# Patient Record
Sex: FEMALE | Race: OTHER | Employment: PART TIME | URBAN - METROPOLITAN AREA
[De-identification: names, ages, dates, MRNs, and addresses within clinical notes are randomized per-mention and may not be internally consistent; named-entity substitution may affect disease eponyms.]

---

## 2018-07-18 ENCOUNTER — HOSPITAL ENCOUNTER (OUTPATIENT)
Dept: GENERAL RADIOLOGY | Facility: HOSPITAL | Age: 23
Discharge: HOME | End: 2018-07-18
Attending: INTERNAL MEDICINE
Payer: COMMERCIAL

## 2018-07-18 ENCOUNTER — HOSPITAL ENCOUNTER (OUTPATIENT)
Dept: CT IMAGING | Facility: HOSPITAL | Age: 23
Discharge: HOME | End: 2018-07-18
Attending: INTERNAL MEDICINE
Payer: COMMERCIAL

## 2018-07-18 ENCOUNTER — OFFICE VISIT (OUTPATIENT)
Dept: INTERNAL MEDICINE CLINIC | Facility: CLINIC | Age: 23
End: 2018-07-18
Payer: COMMERCIAL

## 2018-07-18 VITALS
DIASTOLIC BLOOD PRESSURE: 81 MMHG | RESPIRATION RATE: 18 BRPM | WEIGHT: 123 LBS | HEART RATE: 71 BPM | BODY MASS INDEX: 22.07 KG/M2 | TEMPERATURE: 98 F | HEIGHT: 62.5 IN | SYSTOLIC BLOOD PRESSURE: 122 MMHG

## 2018-07-18 DIAGNOSIS — Z76.89 ENCOUNTER TO ESTABLISH CARE: Primary | ICD-10-CM

## 2018-07-18 DIAGNOSIS — T14.90XA TRAUMA: ICD-10-CM

## 2018-07-18 DIAGNOSIS — M54.2 NECK PAIN: ICD-10-CM

## 2018-07-18 DIAGNOSIS — R51.9 HEADACHE IN BACK OF HEAD: ICD-10-CM
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
## 2018-07-18 PROCEDURE — 99204 OFFICE O/P NEW MOD 45 MIN: CPT | Performed by: INTERNAL MEDICINE

## 2018-07-18 PROCEDURE — 72050 X-RAY EXAM NECK SPINE 4/5VWS: CPT | Performed by: INTERNAL MEDICINE

## 2018-07-18 PROCEDURE — 99212 OFFICE O/P EST SF 10 MIN: CPT | Performed by: INTERNAL MEDICINE

## 2018-07-18 PROCEDURE — 70450 CT HEAD/BRAIN W/O DYE: CPT | Performed by: INTERNAL MEDICINE

## 2018-07-18 RX ORDER — CYCLOBENZAPRINE HCL 5 MG
5 TABLET ORAL NIGHTLY
Qty: 10 TABLET | Refills: 0 | Status: SHIPPED | OUTPATIENT
Start: 2018-07-18
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
## 2018-07-19 ENCOUNTER — TELEPHONE (OUTPATIENT)
Dept: INTERNAL MEDICINE CLINIC | Facility: CLINIC | Age: 23
End: 2018-07-19

## 2021-11-04 ENCOUNTER — OFFICE VISIT (OUTPATIENT)
Dept: OBGYN CLINIC | Facility: CLINIC | Age: 26
End: 2021-11-04
Payer: COMMERCIAL

## 2021-11-04 VITALS
SYSTOLIC BLOOD PRESSURE: 104 MMHG | BODY MASS INDEX: 23 KG/M2 | WEIGHT: 125 LBS | DIASTOLIC BLOOD PRESSURE: 62 MMHG | HEIGHT: 62 IN

## 2021-11-04 DIAGNOSIS — Z01.411 ENCOUNTER FOR GYNECOLOGICAL EXAMINATION WITH ABNORMAL FINDING: ICD-10-CM

## 2021-11-04 DIAGNOSIS — Z01.419 WOMEN'S ANNUAL ROUTINE GYNECOLOGICAL EXAMINATION: Primary | ICD-10-CM

## 2021-11-04 DIAGNOSIS — L73.9 FOLLICULITIS OF PERINEUM: ICD-10-CM

## 2021-11-04 DIAGNOSIS — Z11.3 ROUTINE SCREENING FOR STI (SEXUALLY TRANSMITTED INFECTION): ICD-10-CM

## 2021-11-04 PROCEDURE — 3008F BODY MASS INDEX DOCD: CPT | Performed by: OBSTETRICS & GYNECOLOGY

## 2021-11-04 PROCEDURE — 3074F SYST BP LT 130 MM HG: CPT | Performed by: OBSTETRICS & GYNECOLOGY

## 2021-11-04 PROCEDURE — 3078F DIAST BP <80 MM HG: CPT | Performed by: OBSTETRICS & GYNECOLOGY

## 2021-11-04 PROCEDURE — 99385 PREV VISIT NEW AGE 18-39: CPT | Performed by: OBSTETRICS & GYNECOLOGY

## 2021-11-04 RX ORDER — CLINDAMYCIN PHOSPHATE AND BENZOYL PEROXIDE 10; 37.5 MG/G; MG/G
GEL TOPICAL
COMMUNITY
Start: 2021-11-01 | End: 2021-11-04

## 2021-11-04 RX ORDER — CLINDAMYCIN PHOSPHATE AND BENZOYL PEROXIDE 10; 37.5 MG/G; MG/G
GEL TOPICAL
COMMUNITY
Start: 2021-03-29

## 2021-11-04 RX ORDER — CEPHALEXIN 500 MG/1
500 CAPSULE ORAL 3 TIMES DAILY
Qty: 21 CAPSULE | Refills: 0 | Status: SHIPPED | OUTPATIENT
Start: 2021-11-04 | End: 2021-11-11

## 2021-11-04 RX ORDER — MULTIVITAMIN
1 TABLET ORAL DAILY
COMMUNITY

## 2021-11-04 RX ORDER — TAZAROTENE 0.45 MG/G
LOTION TOPICAL
COMMUNITY
Start: 2021-03-29

## 2021-11-04 NOTE — PROGRESS NOTES
Subjective:   Patient ID: Lex Farfan is a 23year old female. Patient here for routine exam and her first pap smear. Reports some midcycle spotting and pain at times. Discussed ovulation pain with midcycle spotting and patient reassured.   Inst Normal breath sounds. Comments: Breast Exam:  No masses. No nipple discharge. No adenopathy. Abdominal:      General: Bowel sounds are normal.      Palpations: Abdomen is soft. There is no mass. Tenderness: There is no abdominal tenderness.

## 2022-02-28 ENCOUNTER — HOSPITAL ENCOUNTER (EMERGENCY)
Facility: HOSPITAL | Age: 27
Discharge: HOME | End: 2022-02-28
Attending: EMERGENCY MEDICINE
Payer: COMMERCIAL

## 2022-02-28 ENCOUNTER — OFFICE VISIT (OUTPATIENT)
Dept: OBGYN CLINIC | Facility: CLINIC | Age: 27
End: 2022-02-28
Payer: COMMERCIAL

## 2022-02-28 VITALS
BODY MASS INDEX: 23.92 KG/M2 | DIASTOLIC BLOOD PRESSURE: 77 MMHG | OXYGEN SATURATION: 100 % | TEMPERATURE: 98 F | SYSTOLIC BLOOD PRESSURE: 123 MMHG | RESPIRATION RATE: 20 BRPM | HEIGHT: 62 IN | WEIGHT: 130 LBS | HEART RATE: 82 BPM

## 2022-02-28 VITALS — BODY MASS INDEX: 24 KG/M2 | SYSTOLIC BLOOD PRESSURE: 109 MMHG | DIASTOLIC BLOOD PRESSURE: 71 MMHG | WEIGHT: 129.81 LBS

## 2022-02-28 DIAGNOSIS — N92.3 INTERMENSTRUAL SPOTTING: ICD-10-CM

## 2022-02-28 DIAGNOSIS — L73.9 FOLLICULITIS: Primary | ICD-10-CM

## 2022-02-28 DIAGNOSIS — L73.1 INGROWN HAIR: ICD-10-CM

## 2022-02-28 DIAGNOSIS — L02.91 ABSCESS: ICD-10-CM

## 2022-02-28 DIAGNOSIS — L02.91 ABSCESS: Primary | ICD-10-CM

## 2022-02-28 LAB — B-HCG UR QL: NEGATIVE

## 2022-02-28 PROCEDURE — 3078F DIAST BP <80 MM HG: CPT | Performed by: NURSE PRACTITIONER

## 2022-02-28 PROCEDURE — 99213 OFFICE O/P EST LOW 20 MIN: CPT | Performed by: NURSE PRACTITIONER

## 2022-02-28 PROCEDURE — 81025 URINE PREGNANCY TEST: CPT

## 2022-02-28 PROCEDURE — 99283 EMERGENCY DEPT VISIT LOW MDM: CPT

## 2022-02-28 PROCEDURE — 10060 I&D ABSCESS SIMPLE/SINGLE: CPT

## 2022-02-28 PROCEDURE — 3074F SYST BP LT 130 MM HG: CPT | Performed by: NURSE PRACTITIONER

## 2022-02-28 RX ORDER — DOXYCYCLINE HYCLATE 100 MG/1
100 CAPSULE ORAL 2 TIMES DAILY
Qty: 20 CAPSULE | Refills: 0 | Status: SHIPPED | OUTPATIENT
Start: 2022-02-28 | End: 2022-03-10
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
## 2022-02-28 NOTE — ED QUICK NOTES
Discharge summary reviewed including medication administration and follow up appointments. Advised to return to the Emergency Department with new or worsening symptoms. Patient verbalized understanding. All questions answered at this time.

## 2022-03-04 ENCOUNTER — OFFICE VISIT (OUTPATIENT)
Dept: OBGYN CLINIC | Facility: CLINIC | Age: 27
End: 2022-03-04
Payer: COMMERCIAL

## 2022-03-04 VITALS — SYSTOLIC BLOOD PRESSURE: 110 MMHG | WEIGHT: 130.63 LBS | BODY MASS INDEX: 24 KG/M2 | DIASTOLIC BLOOD PRESSURE: 60 MMHG

## 2022-03-04 DIAGNOSIS — N73.9 ABSCESS OF FEMALE GENITALIA: Primary | ICD-10-CM

## 2022-03-04 PROCEDURE — 3078F DIAST BP <80 MM HG: CPT | Performed by: NURSE PRACTITIONER

## 2022-03-04 PROCEDURE — 3074F SYST BP LT 130 MM HG: CPT | Performed by: NURSE PRACTITIONER

## 2022-03-04 PROCEDURE — 99213 OFFICE O/P EST LOW 20 MIN: CPT | Performed by: NURSE PRACTITIONER

## 2022-05-16 ENCOUNTER — TELEPHONE (OUTPATIENT)
Dept: OBGYN CLINIC | Facility: CLINIC | Age: 27
End: 2022-05-16

## 2022-05-16 NOTE — TELEPHONE ENCOUNTER
Spoke to patient, stated she her last menses was 40 days ago. Stated her menses are usually on time. took 2 pregnancy test 5 days ago that were negative. Did indicate she took plan b over a month ago but did get her menses 2 wks that. Patient would like blood work order for hormone level. Patient advised S.O. is not in the office today but will send the message to her. Patient stated she has an appt. with her tomorrow. Advised she can follow up with S.O. tomorrow.

## 2022-05-17 ENCOUNTER — OFFICE VISIT (OUTPATIENT)
Dept: OBGYN CLINIC | Facility: CLINIC | Age: 27
End: 2022-05-17
Payer: COMMERCIAL

## 2022-05-17 VITALS
DIASTOLIC BLOOD PRESSURE: 82 MMHG | WEIGHT: 134.81 LBS | BODY MASS INDEX: 25 KG/M2 | HEART RATE: 62 BPM | SYSTOLIC BLOOD PRESSURE: 128 MMHG

## 2022-05-17 DIAGNOSIS — N92.6 IRREGULAR MENSTRUAL CYCLE: Primary | ICD-10-CM

## 2022-05-17 LAB
CONTROL LINE PRESENT WITH A CLEAR BACKGROUND (YES/NO): YES YES/NO
PREGNANCY TEST, URINE: NEGATIVE

## 2022-05-17 PROCEDURE — 3074F SYST BP LT 130 MM HG: CPT | Performed by: NURSE PRACTITIONER

## 2022-05-17 PROCEDURE — 81025 URINE PREGNANCY TEST: CPT | Performed by: NURSE PRACTITIONER

## 2022-05-17 PROCEDURE — 99213 OFFICE O/P EST LOW 20 MIN: CPT | Performed by: NURSE PRACTITIONER

## 2022-05-17 PROCEDURE — 3079F DIAST BP 80-89 MM HG: CPT | Performed by: NURSE PRACTITIONER

## 2022-05-17 RX ORDER — CLINDAMYCIN PHOSPHATE 10 MG/G
GEL TOPICAL
COMMUNITY
Start: 2022-05-04

## 2022-05-17 RX ORDER — TAZAROTENE 1 MG/G
AEROSOL, FOAM TOPICAL
COMMUNITY
Start: 2022-05-10

## 2022-05-17 RX ORDER — CLASCOTERONE 1 G/100G
CREAM TOPICAL
COMMUNITY
Start: 2022-05-10

## 2022-06-16 ENCOUNTER — LAB ENCOUNTER (OUTPATIENT)
Dept: LAB | Facility: HOSPITAL | Age: 27
End: 2022-06-16
Attending: INTERNAL MEDICINE
Payer: COMMERCIAL

## 2022-06-16 DIAGNOSIS — N92.6 IRREGULAR MENSTRUAL CYCLE: ICD-10-CM

## 2022-06-16 DIAGNOSIS — Z11.3 ROUTINE SCREENING FOR STI (SEXUALLY TRANSMITTED INFECTION): ICD-10-CM

## 2022-06-16 LAB
HBV SURFACE AG SER-ACNC: <0.1 [IU]/L
HBV SURFACE AG SERPL QL IA: NONREACTIVE
HCV AB SERPL QL IA: NONREACTIVE
TSI SER-ACNC: 1.53 MIU/ML (ref 0.36–3.74)

## 2022-06-16 PROCEDURE — 87340 HEPATITIS B SURFACE AG IA: CPT

## 2022-06-16 PROCEDURE — 86803 HEPATITIS C AB TEST: CPT

## 2022-06-16 PROCEDURE — 36415 COLL VENOUS BLD VENIPUNCTURE: CPT

## 2022-06-16 PROCEDURE — 86780 TREPONEMA PALLIDUM: CPT

## 2022-06-16 PROCEDURE — 84443 ASSAY THYROID STIM HORMONE: CPT

## 2022-06-16 PROCEDURE — 87389 HIV-1 AG W/HIV-1&-2 AB AG IA: CPT

## 2022-06-17 LAB — T PALLIDUM AB SER QL: NEGATIVE

## 2022-08-11 ENCOUNTER — TELEPHONE (OUTPATIENT)
Dept: OBGYN CLINIC | Facility: CLINIC | Age: 27
End: 2022-08-11

## 2022-08-11 NOTE — TELEPHONE ENCOUNTER
Pt is asking if a chlymidia test has ever been done. Pt is also asking if an appt can be done for this iin-office or does this require an order.     Please advise

## 2022-08-15 ENCOUNTER — OFFICE VISIT (OUTPATIENT)
Dept: OBGYN CLINIC | Facility: CLINIC | Age: 27
End: 2022-08-15
Payer: COMMERCIAL

## 2022-08-15 ENCOUNTER — LAB ENCOUNTER (OUTPATIENT)
Dept: LAB | Facility: HOSPITAL | Age: 27
End: 2022-08-15
Attending: NURSE PRACTITIONER
Payer: COMMERCIAL

## 2022-08-15 VITALS — SYSTOLIC BLOOD PRESSURE: 140 MMHG | DIASTOLIC BLOOD PRESSURE: 98 MMHG

## 2022-08-15 DIAGNOSIS — Z11.3 SCREEN FOR STD (SEXUALLY TRANSMITTED DISEASE): Primary | ICD-10-CM

## 2022-08-15 DIAGNOSIS — Z11.3 SCREEN FOR STD (SEXUALLY TRANSMITTED DISEASE): ICD-10-CM

## 2022-08-15 LAB
HBV SURFACE AG SER-ACNC: <0.1 [IU]/L
HBV SURFACE AG SERPL QL IA: NONREACTIVE
HCV AB SERPL QL IA: NONREACTIVE

## 2022-08-15 PROCEDURE — 36415 COLL VENOUS BLD VENIPUNCTURE: CPT

## 2022-08-15 PROCEDURE — 87389 HIV-1 AG W/HIV-1&-2 AB AG IA: CPT

## 2022-08-15 PROCEDURE — 3077F SYST BP >= 140 MM HG: CPT | Performed by: NURSE PRACTITIONER

## 2022-08-15 PROCEDURE — 87340 HEPATITIS B SURFACE AG IA: CPT

## 2022-08-15 PROCEDURE — 86780 TREPONEMA PALLIDUM: CPT

## 2022-08-15 PROCEDURE — 99213 OFFICE O/P EST LOW 20 MIN: CPT | Performed by: NURSE PRACTITIONER

## 2022-08-15 PROCEDURE — 86803 HEPATITIS C AB TEST: CPT

## 2022-08-15 PROCEDURE — 3080F DIAST BP >= 90 MM HG: CPT | Performed by: NURSE PRACTITIONER

## 2022-08-16 LAB
C TRACH DNA SPEC QL NAA+PROBE: NEGATIVE
N GONORRHOEA DNA SPEC QL NAA+PROBE: NEGATIVE

## 2022-08-17 LAB — T PALLIDUM AB SER QL: NEGATIVE

## 2022-08-18 LAB
GENITAL VAGINOSIS SCREEN: NEGATIVE
TRICHOMONAS SCREEN: NEGATIVE

## 2024-01-18 ENCOUNTER — OFFICE VISIT (OUTPATIENT)
Dept: DERMATOLOGY CLINIC | Facility: CLINIC | Age: 29
End: 2024-01-18

## 2024-01-18 ENCOUNTER — TELEPHONE (OUTPATIENT)
Dept: DERMATOLOGY CLINIC | Facility: CLINIC | Age: 29
End: 2024-01-18

## 2024-01-18 ENCOUNTER — LAB ENCOUNTER (OUTPATIENT)
Dept: LAB | Age: 29
End: 2024-01-18
Attending: STUDENT IN AN ORGANIZED HEALTH CARE EDUCATION/TRAINING PROGRAM
Payer: MEDICAID

## 2024-01-18 DIAGNOSIS — Z51.81 MEDICATION MONITORING ENCOUNTER: Primary | ICD-10-CM

## 2024-01-18 DIAGNOSIS — L70.8 OTHER ACNE: ICD-10-CM

## 2024-01-18 DIAGNOSIS — Z51.81 MEDICATION MONITORING ENCOUNTER: ICD-10-CM

## 2024-01-18 LAB
ALT SERPL-CCNC: 9 U/L
AST SERPL-CCNC: 12 U/L (ref ?–34)
B-HCG UR QL: NEGATIVE
TRIGL SERPL-MCNC: 47 MG/DL (ref 30–149)

## 2024-01-18 PROCEDURE — 84450 TRANSFERASE (AST) (SGOT): CPT

## 2024-01-18 PROCEDURE — 81025 URINE PREGNANCY TEST: CPT

## 2024-01-18 PROCEDURE — 36415 COLL VENOUS BLD VENIPUNCTURE: CPT

## 2024-01-18 PROCEDURE — 84460 ALANINE AMINO (ALT) (SGPT): CPT

## 2024-01-18 PROCEDURE — 84478 ASSAY OF TRIGLYCERIDES: CPT

## 2024-01-18 PROCEDURE — 99204 OFFICE O/P NEW MOD 45 MIN: CPT | Performed by: STUDENT IN AN ORGANIZED HEALTH CARE EDUCATION/TRAINING PROGRAM

## 2024-01-18 RX ORDER — DAPSONE 50 MG/G
GEL TOPICAL
COMMUNITY
Start: 2022-07-05

## 2024-01-18 RX ORDER — ISOTRETINOIN 40 MG/1
40 CAPSULE ORAL DAILY
Qty: 30 CAPSULE | Refills: 0 | Status: SHIPPED | OUTPATIENT
Start: 2024-01-18

## 2024-01-18 RX ORDER — NORGESTIMATE AND ETHINYL ESTRADIOL 0.25-0.035
1 KIT ORAL DAILY
Qty: 84 TABLET | Refills: 0 | Status: SHIPPED | OUTPATIENT
Start: 2024-01-18 | End: 2024-01-18

## 2024-01-18 RX ORDER — NORGESTIMATE AND ETHINYL ESTRADIOL 0.25-0.035
1 KIT ORAL DAILY
Qty: 84 TABLET | Refills: 3 | Status: SHIPPED | OUTPATIENT
Start: 2024-01-18 | End: 2025-01-17

## 2024-01-18 NOTE — PROGRESS NOTES
January 18, 2024    New patient     CHIEF COMPLAINT: Acne    HISTORY OF PRESENT ILLNESS: .    1. Acne  Location: Face, chin, upper neck and sids of face   Duration: years  Signs and symptoms: white heads  Current treatment: Tazarotene 0.05 % Cream and dapzone cream  Past treatments: clindamycin       DERM HISTORY:  History of skin cancer: No  History of chronic skin disease/condition: No    FAMILY HISTORY:  History of melanoma: No  History of chronic skin disease/condition: No    History/Other:    REVIEW OF SYSTEMS:  Constitutional: Denies fever, chills, unintentional weight loss.   Skin as per HPI    PAST MEDICAL HISTORY:  Past Medical History:   Diagnosis Date    Acne        Medications  Current Outpatient Medications   Medication Sig Dispense Refill    Clascoterone (WINLEVI) 1 % External Cream       Clindamycin Phosphate 1 % External Gel       Tazarotene 0.1 % External Foam       Tazarotene 0.05 % External Cream       Multiple Vitamin Oral Tab Take 1 tablet by mouth daily.         Objective:    PHYSICAL EXAM:  General: awake, alert, no acute distress  Skin: Skin exam was performed today including the following: face. Pertinent findings include:   - with erythematous papules    ASSESSMENT & PLAN:  Pathophysiology of diagnoses discussed with patient.  Therapeutic options reviewed. Risks, benefits, and alternatives discussed with patient. Instructions reviewed at length.    #Acne Vulgaris, nodulocystic  - Given patient has failed topical medications and oral medications, isotretinoin is recommended at this point. Reviewed alternative systemic treatment options. Discussed, with patient and parent, that without systemic therapy, risks include irreversible scarring of affected areas.  - Goal dose 200mg/kg = 14986 mg    - Discussed patient cannot become pregnant, breast feed, or donate blood while on the medication and for 1 month after stopping. Discussed that patient cannot share medication.  - Labs today  AST, ALT,  fasting lipid panel, qualitative HCG  - Qualitative HCG to be repeated at least one month after initial testing and within the first 5 days of menstrual cycle. If pregnancy test negative at that time, script for isotretinoin to be sent to pharmacy and patient to be confirmed in iPledge.    Contraceptive methods: PRIMARY: OCP, SECONDARY:condoms       I discussed at length the issues of isotretnoin therapy including goal of treatment, medication dosage, duration of therapy, and side effects, as well as the alternative to care.     Patient denies personal/family hx of IBD  Patient denies personal/family hx of depression/suicide    Reviewed adverse effects including those that are common and not serious, such as dry skin (xerosis) with/without retinoid dermatitis, dry lips (cheilitis), dry nose (xeromycteria) with/without epistaxis, dry eyes (xerophthalmia), irritation of contact lenses, dyslipidemia (increase in cholesterol and triglycerides), phototoxicity, possible exacerbation/flaring of acne vulgaris in the first 4 to 6 weeks of therapy, and arthralgias/myalgias (muscle aches and joint pains), as well as those that are rare, but serious, such as pseudotumor cerebri, major depressive disorder, suicidal ideation, hepatotoxicity, pancreatitis, teratogenicity (females), anemia/leukopenia, changes to night vision, and possible unmasking or exacerbation of inflammatory bowel disease.    The patient understands there is a possibility of acne recurrence; however, acne is often easier to treat if it recurs after isotretinoin therapy. Patient agrees to not give blood during treatment and for 1 month after treatment. Patient agrees not to share medication. Patient agrees remain on chosen forms of contraception while on the medication and continue 1 month after stopping. If there are any adverse events/symptoms including depression, thoughts of suicide, diarrhea, abdominal pain or cramping, blood in stool, or other concerning  side effects, patient will contact us or head directly to ED for immediate evaluation. Patient informed to stop all other acne medications while on isotretinoin. Informed patient they can take Ibuprofen or NSAIDs for joint aches. Instructed to avoid Tylenol and alcohol due to increased risk of liver toxicity while on isotretinoin. Questions were answered, Patient expressed understanding of the risks, benefits, alternatives and guidelines and would like to proceed with isotretinoin therapy. Counseling and consents signed today and iPledge booklet given to patient.      Return to clinic: 2 months or sooner if something concerning arises     Rashel Gutierrez MD

## 2024-01-18 NOTE — TELEPHONE ENCOUNTER
Patient Name: Alessia Bhatti   Street Address: [address]    State: [place]  Zip Code: [address]   Telephone #: [phone]   E-Mail Address:   [email or web address]     Preferred Method of Contact (e-mail or text): Text    Date Consent Signed: 01/18/24    Childbearing Potential Patients  Primary Contraceptive:Birth Control  Secondary Contraceptive: Condom    Labs in process

## 2024-01-19 NOTE — TELEPHONE ENCOUNTER
Fax from [facility]    Denied for ISOTRETINOIN    placed fax in pa inbox

## 2024-01-22 NOTE — TELEPHONE ENCOUNTER
Dr. Gutierrez - [place] denied.  Would you like to appeal?  PA denial letter in your office for review.  Thank you.

## 2024-02-07 ENCOUNTER — MED REC SCAN ONLY (OUTPATIENT)
Dept: DERMATOLOGY CLINIC | Facility: CLINIC | Age: 29
End: 2024-02-07

## 2024-02-14 ENCOUNTER — TELEPHONE (OUTPATIENT)
Dept: DERMATOLOGY CLINIC | Facility: CLINIC | Age: 29
End: 2024-02-14

## 2024-02-17 ENCOUNTER — LAB ENCOUNTER (OUTPATIENT)
Dept: LAB | Age: 29
End: 2024-02-17
Attending: STUDENT IN AN ORGANIZED HEALTH CARE EDUCATION/TRAINING PROGRAM
Payer: MEDICAID

## 2024-02-17 DIAGNOSIS — L70.8 OTHER ACNE: ICD-10-CM

## 2024-02-17 DIAGNOSIS — Z51.81 MEDICATION MONITORING ENCOUNTER: ICD-10-CM

## 2024-02-17 LAB
ALT SERPL-CCNC: 11 U/L
AST SERPL-CCNC: 15 U/L (ref ?–34)
B-HCG UR QL: NEGATIVE
TRIGL SERPL-MCNC: 58 MG/DL (ref 30–149)

## 2024-02-17 PROCEDURE — 36415 COLL VENOUS BLD VENIPUNCTURE: CPT

## 2024-02-17 PROCEDURE — 84478 ASSAY OF TRIGLYCERIDES: CPT

## 2024-02-17 PROCEDURE — 81025 URINE PREGNANCY TEST: CPT

## 2024-02-17 PROCEDURE — 84460 ALANINE AMINO (ALT) (SGPT): CPT

## 2024-02-17 PROCEDURE — 84450 TRANSFERASE (AST) (SGOT): CPT

## 2024-02-21 ENCOUNTER — PATIENT MESSAGE (OUTPATIENT)
Dept: DERMATOLOGY CLINIC | Facility: CLINIC | Age: 29
End: 2024-02-21

## 2024-02-21 DIAGNOSIS — L73.0 ACNE SCARRING: ICD-10-CM

## 2024-02-21 DIAGNOSIS — L70.8 OTHER ACNE: ICD-10-CM

## 2024-02-21 DIAGNOSIS — L70.0 NODULOCYSTIC ACNE: ICD-10-CM

## 2024-02-21 DIAGNOSIS — L70.0 ACNE VULGARIS: ICD-10-CM

## 2024-02-22 RX ORDER — ISOTRETINOIN 40 MG/1
40 CAPSULE ORAL DAILY
Qty: 30 CAPSULE | Refills: 0 | Status: SHIPPED
Start: 2024-02-22

## 2024-02-22 NOTE — TELEPHONE ENCOUNTER
Dr. Gutierrez - Pt states she will use a Goodrx coupon for the Isotretinoin.  Pt asking if you could please call and give a verbal to the [facility] in [place] [phone].  Today is the last day patient can  rx within the window.  Thank you.

## 2024-02-22 NOTE — TELEPHONE ENCOUNTER
From: Alessia Bhatti  To: Rashel Gutierrez  Sent: 2/21/2024 7:07 PM CST  Subject: Accutane Follow-up     Hello Dr. Gutierrez,    I’m just following up to see if you were able to put in my negative pregnancy test results from the weekend into my iPledge account. I’ve been checking it over the last few days and it’s still showing my pregnancy test is due.     Thank you!

## 2024-02-22 NOTE — TELEPHONE ENCOUNTER
Called pt's plan and they could not find active coverage for her. Tahir sent to pt, she needs to contact her plan. I did manage her per DM's VORB. She has until tomorrow to  the RX if she can get the INS figured out. LMTCB for pt.    Prescription Window  Patient can obtain their prescription from:  February 17, 2024 - February 23, 2024 (7 - Day Prescription window)

## 2024-02-22 NOTE — TELEPHONE ENCOUNTER
S/w pt - she is aware of the INS issue below - states she will pay out of pocket - I did resent RX with DM's VORB as prior RX transmission failed. Pt will follow up with pharmacy today, I did also leave her a msg that her window will  tomorrow and this must be done today.

## 2024-03-10 ENCOUNTER — HOSPITAL ENCOUNTER (EMERGENCY)
Facility: HOSPITAL | Age: 29
Discharge: HOME | End: 2024-03-10
Attending: EMERGENCY MEDICINE
Payer: MEDICAID

## 2024-03-10 VITALS
HEART RATE: 93 BPM | TEMPERATURE: 98 F | BODY MASS INDEX: 23.92 KG/M2 | HEIGHT: 62 IN | RESPIRATION RATE: 18 BRPM | DIASTOLIC BLOOD PRESSURE: 80 MMHG | SYSTOLIC BLOOD PRESSURE: 162 MMHG | WEIGHT: 130 LBS | OXYGEN SATURATION: 99 %

## 2024-03-10 DIAGNOSIS — L03.019 ONYCHIA AND PARONYCHIA OF FINGER: ICD-10-CM

## 2024-03-10 DIAGNOSIS — L60.0 INGROWN NAIL OF GREAT TOE OF RIGHT FOOT: Primary | ICD-10-CM

## 2024-03-10 PROCEDURE — 99283 EMERGENCY DEPT VISIT LOW MDM: CPT

## 2024-03-10 PROCEDURE — 11750 EXCISION NAIL&NAIL MATRIX: CPT

## 2024-03-10 RX ORDER — SULFAMETHOXAZOLE AND TRIMETHOPRIM 800; 160 MG/1; MG/1
1 TABLET ORAL 2 TIMES DAILY
Qty: 10 TABLET | Refills: 0 | Status: SHIPPED | OUTPATIENT
Start: 2024-03-10 | End: 2024-03-15
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
## 2024-03-13 ENCOUNTER — OFFICE VISIT (OUTPATIENT)
Facility: LOCATION | Age: 29
End: 2024-03-13
Payer: MEDICAID

## 2024-03-13 DIAGNOSIS — L60.0 INGROWING NAIL, RIGHT GREAT TOE: Primary | ICD-10-CM

## 2024-03-13 PROCEDURE — 99202 OFFICE O/P NEW SF 15 MIN: CPT | Performed by: PODIATRIST
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
## 2024-03-21 ENCOUNTER — LAB ENCOUNTER (OUTPATIENT)
Dept: LAB | Age: 29
End: 2024-03-21
Attending: STUDENT IN AN ORGANIZED HEALTH CARE EDUCATION/TRAINING PROGRAM
Payer: MEDICAID

## 2024-03-21 DIAGNOSIS — L70.8 OTHER ACNE: ICD-10-CM

## 2024-03-21 DIAGNOSIS — Z51.81 MEDICATION MONITORING ENCOUNTER: ICD-10-CM

## 2024-03-21 LAB
ALT SERPL-CCNC: 8 U/L
AST SERPL-CCNC: 17 U/L (ref ?–34)
B-HCG UR QL: NEGATIVE
TRIGL SERPL-MCNC: 35 MG/DL (ref 30–149)

## 2024-03-21 PROCEDURE — 84478 ASSAY OF TRIGLYCERIDES: CPT

## 2024-03-21 PROCEDURE — 84450 TRANSFERASE (AST) (SGOT): CPT

## 2024-03-21 PROCEDURE — 84460 ALANINE AMINO (ALT) (SGPT): CPT

## 2024-03-21 PROCEDURE — 36415 COLL VENOUS BLD VENIPUNCTURE: CPT

## 2024-03-21 PROCEDURE — 81025 URINE PREGNANCY TEST: CPT

## 2024-03-22 ENCOUNTER — OFFICE VISIT (OUTPATIENT)
Dept: OBGYN CLINIC | Facility: CLINIC | Age: 29
End: 2024-03-22
Payer: MEDICAID

## 2024-03-22 ENCOUNTER — LAB ENCOUNTER (OUTPATIENT)
Dept: LAB | Facility: HOSPITAL | Age: 29
End: 2024-03-22
Attending: STUDENT IN AN ORGANIZED HEALTH CARE EDUCATION/TRAINING PROGRAM
Payer: MEDICAID

## 2024-03-22 VITALS
SYSTOLIC BLOOD PRESSURE: 118 MMHG | HEIGHT: 63 IN | DIASTOLIC BLOOD PRESSURE: 72 MMHG | WEIGHT: 143 LBS | BODY MASS INDEX: 25.34 KG/M2

## 2024-03-22 DIAGNOSIS — L70.8 OTHER ACNE: ICD-10-CM

## 2024-03-22 DIAGNOSIS — L70.0 NODULOCYSTIC ACNE: ICD-10-CM

## 2024-03-22 DIAGNOSIS — L70.0 ACNE VULGARIS: ICD-10-CM

## 2024-03-22 DIAGNOSIS — L73.0 ACNE SCARRING: ICD-10-CM

## 2024-03-22 DIAGNOSIS — Z11.3 SCREENING EXAMINATION FOR STD (SEXUALLY TRANSMITTED DISEASE): ICD-10-CM

## 2024-03-22 DIAGNOSIS — Z01.419 WELL WOMAN EXAM WITH ROUTINE GYNECOLOGICAL EXAM: Primary | ICD-10-CM

## 2024-03-22 DIAGNOSIS — Z32.00 PREGNANCY EXAMINATION OR TEST, PREGNANCY UNCONFIRMED: ICD-10-CM

## 2024-03-22 LAB
CONTROL LINE PRESENT WITH A CLEAR BACKGROUND (YES/NO): YES YES/NO
HBV SURFACE AG SER-ACNC: 0.23 [IU]/L
HBV SURFACE AG SERPL QL IA: NONREACTIVE
HCV AB SERPL QL IA: NONREACTIVE
PREGNANCY TEST, URINE: NEGATIVE
T PALLIDUM AB SER QL IA: NONREACTIVE

## 2024-03-22 PROCEDURE — 36415 COLL VENOUS BLD VENIPUNCTURE: CPT

## 2024-03-22 PROCEDURE — 81025 URINE PREGNANCY TEST: CPT | Performed by: STUDENT IN AN ORGANIZED HEALTH CARE EDUCATION/TRAINING PROGRAM

## 2024-03-22 PROCEDURE — 90651 9VHPV VACCINE 2/3 DOSE IM: CPT | Performed by: STUDENT IN AN ORGANIZED HEALTH CARE EDUCATION/TRAINING PROGRAM

## 2024-03-22 PROCEDURE — 86780 TREPONEMA PALLIDUM: CPT

## 2024-03-22 PROCEDURE — 87340 HEPATITIS B SURFACE AG IA: CPT

## 2024-03-22 PROCEDURE — 86803 HEPATITIS C AB TEST: CPT

## 2024-03-22 PROCEDURE — 90471 IMMUNIZATION ADMIN: CPT | Performed by: STUDENT IN AN ORGANIZED HEALTH CARE EDUCATION/TRAINING PROGRAM

## 2024-03-22 PROCEDURE — 87389 HIV-1 AG W/HIV-1&-2 AB AG IA: CPT

## 2024-03-22 PROCEDURE — 99385 PREV VISIT NEW AGE 18-39: CPT | Performed by: STUDENT IN AN ORGANIZED HEALTH CARE EDUCATION/TRAINING PROGRAM

## 2024-03-22 NOTE — PROGRESS NOTES
[facility]  Obstetrics and Gynecology  Annual  Nacho Shukla PA-C    Chief Complaint   Patient presents with    Annual     Pt presents for annual exam no concerns       Alessia Bhatti is a 29 year old female  presenting for her annual. Patient's last menstrual period was 2024 (exact date). Cycles are regular with menses lasting about 5 days with varying flow. She is currently on Sprintec as she is taking Acutane. Has not been sexually active in over a year, but would like to have a full STD screening completed. Patient thinks she received only the first dose of the Gardasil vaccine, cannot obtain records from her pediatrician. Interested in receiving vaccine today. She denies any abnormal vaginal discharge, odor, irritation, or itching. No breast masses or pain. No dysuria or hematuria.      Pap:2021   Contraception: OCPs    OBSTETRICS HISTORY:     OB History    Para Term  AB Living   0 0 0 0 0 0   SAB IAB Ectopic Multiple Live Births   0 0 0 0 0       GYNE HISTORY:     Menarche: 12 y/o (3/22/2024  9:01 AM)  Period Cycle (Days): 28 days (3/22/2024  9:01 AM)  Period Duration (Days): 5 days (3/22/2024  9:01 AM)  Period Flow: varies (3/22/2024  9:01 AM)  Use of Birth Control (if yes, specify type): OCP (3/22/2024  9:01 AM)  Hx Prior Abnormal Pap: No (3/22/2024  9:01 AM)  Pap Date: 21 (3/22/2024  9:01 AM)  Pap Result Notes: Normal (3/22/2024  9:01 AM)      History   Sexual Activity    Sexual activity: Not on file           Latest Ref Rng & Units 2021     2:30 PM   RECENT PAP RESULTS   Thinprep Pap Negative for intraepithelial lesion or malignancy Negative for intraepithelial lesion or malignancy    HPV Negative Negative          MEDICAL HISTORY:     Past Medical History:   Diagnosis Date    Acne       Past Surgical History:   Procedure Laterality Date    OTHER SURGICAL HISTORY  March-2021    Mole removals       SOCIAL HISTORY:     Tobacco Use: Low Risk  (3/22/2024)     Patient History     Smoking Tobacco Use: Never     Smokeless Tobacco Use: Never     Passive Exposure: Not on file       Depression Screening:   Depression Screening (PHQ-2/PHQ-9): Over the LAST 2 WEEKS   Little interest or pleasure in doing things (over the last two weeks)?: Not at all  Little interest or pleasure in doing things: Not at all    Feeling down, depressed, or hopeless (over the last two weeks)?: Not at all  Feeling down, depressed, or hopeless: Not at all    PHQ-2 SCORE: 0  PHQ-2 SCORE: 0          FAMILY HISTORY:     Family History   Problem Relation Age of Onset    Cancer Paternal Grandmother         Breast cancer       MEDICATIONS:       Current Outpatient Medications:     Isotretinoin 40 MG Oral Cap, Take 1 capsule (40 mg total) by mouth daily., Disp: 30 capsule, Rfl: 0    Norgestimate-Eth Estradiol (SPRINTEC 28) 0.25-35 MG-MCG Oral Tab, Take 1 tablet by mouth daily., Disp: 84 tablet, Rfl: 3    Multiple Vitamin Oral Tab, Take 1 tablet by mouth daily., Disp: , Rfl:     ALLERGIES:     No Known Allergies    REVIEW OF SYSTEMS:     Review of Systems   Constitutional:  Negative for chills, fever and unexpected weight change.   Respiratory: Negative.     Cardiovascular: Negative.    Gastrointestinal:  Negative for abdominal pain, constipation, diarrhea and nausea.   Genitourinary:  Negative for dyspareunia, dysuria, genital sores, hematuria, menstrual problem, pelvic pain, vaginal bleeding, vaginal discharge and vaginal pain.   Musculoskeletal: Negative.    Skin: Negative.    Neurological: Negative.    Hematological: Negative.    Psychiatric/Behavioral: Negative.           PHYSICAL EXAM:     Vitals:    03/22/24 0902   BP: 118/72   Weight: 143 lb (64.9 kg)   Height: 5' 3\" (1.6 m)       Body mass index is 25.33 kg/m².     Constitutional: well developed, well nourished  Psychiatric:  Oriented to time, place, person and situation. Appropriate mood and affect  Head/Face: normocephalic  Neck/Thyroid: thyroid  symmetric, no thyromegaly, no nodules, no adenopathy  Lymphatic:no abnormal supraclavicular or axillary adenopathy is noted  Breast: normal without palpable masses, tenderness, asymmetry, nipple discharge, nipple retraction or skin changes  Abdomen:  soft, nontender, nondistended, no masses  Skin/Hair: no unusual rashes or bruises  Extremities: no edema, no cyanosis    Pelvic Exam:  External Genitalia: normal appearance, hair distribution, and no lesions  Urethral Meatus:  normal in size, location, without lesions and prolapse  Bladder:  No fullness, masses or tenderness  Vagina:  Normal appearance without lesions, no abnormal discharge  Cervix:  Normal without tenderness on motion  Uterus: normal in size, contour, position, mobility, without tenderness  Adnexa: normal without masses or tenderness  Perineum: normal  Anus: no hemorroids       ASSESSMENT:     Alessia was seen today for annual.    Diagnoses and all orders for this visit:    Well woman exam with routine gynecological exam  -     ThinPrep Pap with HPV Reflex, Chlamydia/GC    Screening examination for STD (sexually transmitted disease)  -     Chlamydia/Gc Amplification  -     HCV Antibody; Future  -     Hepatitis B Surface Antigen; Future  -     HIV AG AB Combo; Future  -     T PALLIDUM SCREENING CASCADE; Future    Other orders  -     GARDASIL 9            PLAN:   Normal exam.  Pap smear and GC/Chlamydia cervical cultures done.  Contraceptive counseling completed.   Discussed safe sex practices and condom use.  Recommend daily exercise and well-rounded diet.  Return to clinic in one year or as needed.    Gardasil vaccine given.      SUMMARY:  Pap: Next cotest 3-5 years per ASCCP guidelines.  BCM:  OCP  STD screening: GC/Chl/Trich/HepB/HepC/HIV/RPR, condoms encouraged  Mammogram: n/a -- once 40 yrs old  HM updated    FOLLOW-UP     No follow-ups on file.    RADHA KURTZ PA-C  9:13 AM  3/22/2024    Note to patient and family:  The 21st Century Cures  Act makes medical notes available to patients in the interest of transparency.  However, please be advised that this is a medical document.  It is intended as a peer to peer communication.  It is written in medical language and may contain abbreviations or verbiage that are technical and unfamiliar.  It may appear blunt or direct.  Medical documents are intended to carry relevant information, facts as evident, and the clinical opinion of the practitioner.

## 2024-03-25 LAB
C TRACH DNA SPEC QL NAA+PROBE: NEGATIVE
N GONORRHOEA DNA SPEC QL NAA+PROBE: NEGATIVE

## 2024-03-25 RX ORDER — ISOTRETINOIN 40 MG/1
40 CAPSULE ORAL DAILY
Qty: 30 CAPSULE | Refills: 0 | Status: SHIPPED | OUTPATIENT
Start: 2024-03-25

## 2024-03-25 NOTE — TELEPHONE ENCOUNTER
Pt confirmed.  Patient can obtain their prescription from:  March 21, 2024 - March 27, 2024 (7 - Day Prescription window)

## 2024-03-29 ENCOUNTER — OFFICE VISIT (OUTPATIENT)
Dept: FAMILY MEDICINE CLINIC | Facility: CLINIC | Age: 29
End: 2024-03-29
Payer: MEDICAID

## 2024-03-29 VITALS
HEIGHT: 63 IN | HEART RATE: 67 BPM | WEIGHT: 144.19 LBS | BODY MASS INDEX: 25.55 KG/M2 | DIASTOLIC BLOOD PRESSURE: 82 MMHG | SYSTOLIC BLOOD PRESSURE: 128 MMHG

## 2024-03-29 DIAGNOSIS — J30.1 SEASONAL ALLERGIC RHINITIS DUE TO POLLEN: ICD-10-CM

## 2024-03-29 DIAGNOSIS — Z11.1 SCREENING-PULMONARY TB: ICD-10-CM

## 2024-03-29 DIAGNOSIS — Z00.00 WELL ADULT EXAM: Primary | ICD-10-CM
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
## 2024-03-29 PROBLEM — L73.9 FOLLICULITIS OF PERINEUM: Status: RESOLVED | Noted: 2021-11-04 | Resolved: 2024-03-29

## 2024-03-29 PROCEDURE — 99395 PREV VISIT EST AGE 18-39: CPT | Performed by: NURSE PRACTITIONER

## 2024-03-29 NOTE — PROGRESS NOTES
HPI  Pt here for well visit    Recently had pap done. Is on ocps-tolerating well. 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     Family medical history-gm-breast cancer  Does not do self breast exam    Is on acutane for acne.      Needs quant gold for school   Review of Systems   Constitutional:  Positive for unexpected weight change. Negative for activity change, appetite change, fatigue and fever.   HENT:  Negative for congestion, ear pain, rhinorrhea and sneezing.    Eyes:  Negative for pain, redness and visual disturbance.   Respiratory:  Negative for cough, chest tightness, shortness of breath and wheezing.    Cardiovascular:  Negative for chest pain and palpitations.   Gastrointestinal:  Negative for abdominal distention, abdominal pain, constipation, diarrhea, nausea and vomiting.   Genitourinary:  Negative for dysuria and menstrual problem.   Musculoskeletal:  Negative for back pain, gait problem and myalgias.   Skin:  Negative for color change and rash.        Using acutane for acne   Neurological:  Negative for dizziness, weakness and headaches.   Psychiatric/Behavioral:  Negative for dysphoric mood and sleep disturbance. The patient is not nervous/anxious.        Vitals:    03/29/24 1101   BP: 128/82   Pulse: 67   Weight: 144 lb 3.2 oz (65.4 kg)   Height: 5' 3\" (1.6 m)     Body mass index is 25.54 kg/m².  Wt Readings from Last 6 Encounters:   03/29/24 144 lb 3.2 oz (65.4 kg)   03/22/24 143 lb (64.9 kg)   03/10/24 130 lb (59 kg)   05/17/22 134 lb 12.8 oz (61.1 kg)   03/04/22 130 lb 9.6 oz (59.2 kg)   02/28/22 130 lb (59 kg)        Health Maintenance   Topic Date Due    Annual Physical  Never done    COVID-19 Vaccine (3 - 2023-24 season) 09/01/2023    HPV Vaccines Ages 27-45 (2 - 3-dose series) 04/19/2024    Pap Smear  03/22/2027     DTaP,Tdap,and Td Vaccines (2 - Td or Tdap) 03/30/2033    Influenza Vaccine  Completed    Annual Depression Screening  Completed    Pneumococcal Vaccine: Birth to 64yrs  Aged Out       Patient's last menstrual period was 03/07/2024 (exact date).    Past Medical History:   Diagnosis Date    Acne        .  Past Surgical History:   Procedure Laterality Date    Other surgical history  March-July 2021    Mole removals       Family History   Problem Relation Age of Onset    Cancer Paternal Grandmother         Breast cancer    Cancer Maternal Grandfather         Lung cancer that metastasized to the brain       Social History     Socioeconomic History    Marital status: Single     Spouse name: Not on file    Number of children: Not on file    Years of education: Not on file    Highest education level: Not on file   Occupational History    Not on file   Tobacco Use    Smoking status: Never    Smokeless tobacco: Never   Vaping Use    Vaping Use: Never used   Substance and Sexual Activity    Alcohol use: Yes     Alcohol/week: 2.0 standard drinks of alcohol     Types: 1 Glasses of wine, 1 Standard drinks or equivalent per week     Comment: socially    Drug use: No    Sexual activity: Not on file   Other Topics Concern    Grew up on a farm No    History of tanning No    Outdoor occupation No    Breast feeding No    Reaction to local anesthetic No    Pt has a pacemaker No    Pt has a defibrillator No   Social History Narrative    Not on file     Social Determinants of Health     Financial Resource Strain: Not on file   Food Insecurity: Not on file   Transportation Needs: Not on file   Physical Activity: Not on file   Stress: Not on file   Social Connections: Not on file   Housing Stability: Not on file       Current Outpatient Medications   Medication Sig Dispense Refill    Isotretinoin 40 MG Oral Cap Take 1 capsule (40 mg total) by mouth daily. 30 capsule 0    Norgestimate-Eth Estradiol (SPRINTEC 28) 0.25-35 MG-MCG Oral Tab Take  1 tablet by mouth daily. 84 tablet 3       Allergies:  No Known Allergies    Physical Exam  Vitals and nursing note reviewed.   Constitutional:       General: She is not in acute distress.     Appearance: Normal appearance. She is well-developed and normal weight.   HENT:      Head: Normocephalic and atraumatic.      Right Ear: Tympanic membrane, ear canal and external ear normal.      Left Ear: Tympanic membrane, ear canal and external ear normal.      Nose: Congestion and rhinorrhea present.      Comments: Pale, boggy turbinates bilaterally; clear rhinorrhea present       Mouth/Throat:      Mouth: Mucous membranes are moist.      Pharynx: Oropharynx is clear. No oropharyngeal exudate or posterior oropharyngeal erythema.   Eyes:      General:         Right eye: No discharge.         Left eye: No discharge.      Conjunctiva/sclera: Conjunctivae normal.      Pupils: Pupils are equal, round, and reactive to light.   Neck:      Thyroid: No thyromegaly.   Cardiovascular:      Rate and Rhythm: Normal rate and regular rhythm.      Heart sounds: Normal heart sounds. No murmur heard.  Pulmonary:      Effort: Pulmonary effort is normal. No respiratory distress.      Breath sounds: Normal breath sounds. No wheezing or rales.   Chest:      Chest wall: No tenderness.   Abdominal:      General: Bowel sounds are normal. There is no distension.      Palpations: Abdomen is soft.      Tenderness: There is no abdominal tenderness.   Musculoskeletal:         General: No tenderness. Normal range of motion.      Cervical back: Normal range of motion and neck supple.   Lymphadenopathy:      Cervical: No cervical adenopathy.   Skin:     General: Skin is warm and dry.      Findings: No rash.   Neurological:      Mental Status: She is alert and oriented to person, place, and time.      Coordination: Coordination normal.   Psychiatric:         Behavior: Behavior normal.         Thought Content: Thought content normal.         Judgment:  Judgment normal.         Assessment and Plan:  Problem List Items Addressed This Visit       Screening-pulmonary TB     Quantiferon gold tb test         Relevant Orders    Quantiferon TB Plus    Seasonal allergic rhinitis due to pollen     -otc non-drowsy antihistamine (generic claritin, zyrtec or allegra)  -add steroidal nasal spray (flonase, rhinocort -generic works well)    -supportive care discussed  -Please call if symptoms worsen or are not resolving.            Well adult exam - Primary     Screening labs  Please aim to eat a diet high in fresh fruits and vegetables, lean protein sources, complex carbohydrates and limited processed and fast foods.  Try to get at least 150 minutes of exercise per week-a combination of weight resistance and cardio is preferred.    Up to date pap           Relevant Orders    CBC, Platelet; No Differential    Comp Metabolic Panel (14)    Hemoglobin A1C    Lipid Panel    TSH W Reflex To Free T4    Vitamin B12    Vitamin D                   Discussed plan of care with pt and pt is in agreement.All questions answered. Pt to call with questions or concerns.    Encouraged to sign up for My Chart if not already registered.

## 2024-03-29 NOTE — ASSESSMENT & PLAN NOTE
Screening labs  Please aim to eat a diet high in fresh fruits and vegetables, lean protein sources, complex carbohydrates and limited processed and fast foods.  Try to get at least 150 minutes of exercise per week-a combination of weight resistance and cardio is preferred.    Up to date pap

## 2024-03-29 NOTE — ASSESSMENT & PLAN NOTE
-otc non-drowsy antihistamine (generic claritin, zyrtec or allegra)  -add steroidal nasal spray (flonase, rhinocort -generic works well)    -supportive care discussed  -Please call if symptoms worsen or are not resolving.

## 2024-03-30 ENCOUNTER — LAB ENCOUNTER (OUTPATIENT)
Dept: LAB | Age: 29
End: 2024-03-30
Attending: NURSE PRACTITIONER
Payer: MEDICAID

## 2024-03-30 DIAGNOSIS — Z00.00 WELL ADULT EXAM: ICD-10-CM

## 2024-03-30 DIAGNOSIS — Z11.1 SCREENING-PULMONARY TB: ICD-10-CM

## 2024-03-30 LAB
ALBUMIN SERPL-MCNC: 4.6 G/DL (ref 3.2–4.8)
ALBUMIN/GLOB SERPL: 1.5 {RATIO} (ref 1–2)
ALP LIVER SERPL-CCNC: 45 U/L
ALT SERPL-CCNC: 9 U/L
ANION GAP SERPL CALC-SCNC: 5 MMOL/L (ref 0–18)
AST SERPL-CCNC: 15 U/L (ref ?–34)
BILIRUB SERPL-MCNC: 0.4 MG/DL (ref 0.3–1.2)
BUN BLD-MCNC: 6 MG/DL (ref 9–23)
BUN/CREAT SERPL: 9.1 (ref 10–20)
CALCIUM BLD-MCNC: 9.7 MG/DL (ref 8.7–10.4)
CHLORIDE SERPL-SCNC: 107 MMOL/L (ref 98–112)
CHOLEST SERPL-MCNC: 130 MG/DL (ref ?–200)
CO2 SERPL-SCNC: 25 MMOL/L (ref 21–32)
CREAT BLD-MCNC: 0.66 MG/DL
DEPRECATED RDW RBC AUTO: 45.8 FL (ref 35.1–46.3)
EGFRCR SERPLBLD CKD-EPI 2021: 122 ML/MIN/1.73M2 (ref 60–?)
ERYTHROCYTE [DISTWIDTH] IN BLOOD BY AUTOMATED COUNT: 14.3 % (ref 11–15)
EST. AVERAGE GLUCOSE BLD GHB EST-MCNC: 111 MG/DL (ref 68–126)
FASTING PATIENT LIPID ANSWER: YES
FASTING STATUS PATIENT QL REPORTED: YES
GLOBULIN PLAS-MCNC: 3 G/DL (ref 2.8–4.4)
GLUCOSE BLD-MCNC: 83 MG/DL (ref 70–99)
HBA1C MFR BLD: 5.5 % (ref ?–5.7)
HCT VFR BLD AUTO: 39.1 %
HDLC SERPL-MCNC: 51 MG/DL (ref 40–59)
HGB BLD-MCNC: 12.5 G/DL
LDLC SERPL CALC-MCNC: 68 MG/DL (ref ?–100)
MCH RBC QN AUTO: 28 PG (ref 26–34)
MCHC RBC AUTO-ENTMCNC: 32 G/DL (ref 31–37)
MCV RBC AUTO: 87.7 FL
NONHDLC SERPL-MCNC: 79 MG/DL (ref ?–130)
OSMOLALITY SERPL CALC.SUM OF ELEC: 281 MOSM/KG (ref 275–295)
PLATELET # BLD AUTO: 305 10(3)UL (ref 150–450)
POTASSIUM SERPL-SCNC: 4.4 MMOL/L (ref 3.5–5.1)
PROT SERPL-MCNC: 7.6 G/DL (ref 5.7–8.2)
RBC # BLD AUTO: 4.46 X10(6)UL
SODIUM SERPL-SCNC: 137 MMOL/L (ref 136–145)
TRIGL SERPL-MCNC: 47 MG/DL (ref 30–149)
TSI SER-ACNC: 0.99 MIU/ML (ref 0.55–4.78)
VIT B12 SERPL-MCNC: 610 PG/ML (ref 211–911)
VIT D+METAB SERPL-MCNC: 17.6 NG/ML (ref 30–100)
VLDLC SERPL CALC-MCNC: 7 MG/DL (ref 0–30)
WBC # BLD AUTO: 5.3 X10(3) UL (ref 4–11)

## 2024-03-30 PROCEDURE — 84443 ASSAY THYROID STIM HORMONE: CPT

## 2024-03-30 PROCEDURE — 83036 HEMOGLOBIN GLYCOSYLATED A1C: CPT

## 2024-03-30 PROCEDURE — 86480 TB TEST CELL IMMUN MEASURE: CPT

## 2024-03-30 PROCEDURE — 82607 VITAMIN B-12: CPT

## 2024-03-30 PROCEDURE — 80053 COMPREHEN METABOLIC PANEL: CPT

## 2024-03-30 PROCEDURE — 82306 VITAMIN D 25 HYDROXY: CPT

## 2024-03-30 PROCEDURE — 85027 COMPLETE CBC AUTOMATED: CPT

## 2024-03-30 PROCEDURE — 80061 LIPID PANEL: CPT

## 2024-03-30 PROCEDURE — 36415 COLL VENOUS BLD VENIPUNCTURE: CPT

## 2024-04-01 DIAGNOSIS — E55.9 VITAMIN D DEFICIENCY: Primary | ICD-10-CM

## 2024-04-01 LAB
M TB IFN-G CD4+ T-CELLS BLD-ACNC: 0.04 IU/ML
M TB TUBERC IFN-G BLD QL: NEGATIVE
M TB TUBERC IGNF/MITOGEN IGNF CONTROL: >10 IU/ML
QFT TB1 AG MINUS NIL: 0.01 IU/ML
QFT TB2 AG MINUS NIL: 0.01 IU/ML

## 2024-04-01 RX ORDER — FOLIC ACID 1 MG/1
50000 TABLET ORAL WEEKLY
Qty: 12 CAPSULE | Refills: 3 | Status: SHIPPED | OUTPATIENT
Start: 2024-04-01

## 2024-04-05 ENCOUNTER — OFFICE VISIT (OUTPATIENT)
Facility: LOCATION | Age: 29
End: 2024-04-05
Payer: MEDICAID

## 2024-04-05 VITALS — DIASTOLIC BLOOD PRESSURE: 78 MMHG | HEART RATE: 77 BPM | SYSTOLIC BLOOD PRESSURE: 130 MMHG

## 2024-04-05 DIAGNOSIS — M79.674 TOE PAIN, RIGHT: ICD-10-CM

## 2024-04-05 DIAGNOSIS — L60.0 INGROWING NAIL, RIGHT GREAT TOE: Primary | ICD-10-CM

## 2024-04-05 PROCEDURE — 11750 EXCISION NAIL&NAIL MATRIX: CPT | Performed by: PODIATRIST

## 2024-04-05 NOTE — PROGRESS NOTES
[facility]  Progress Note    Alessia Bhatti is a 29 year old female.   Chief Complaint   Patient presents with    Ingrown Toenail     F/u Right great too ingrown toe nail. She notice a white spot . Had pain last week, but does not have pain today.         HPI:     Patient is a pleasant 29-year-old female who is returning to clinic today with recheck on right great toenail.  Patient states that she would like the right great toenail evaluated again as she did have some pain last week.  She is concerned that the outside border of the nail is ingrowing.  Denies any pain today.  Denies noticing any signs of infection such as redness or drainage.  She has noticed a little white spot underneath the old nail that is growing out.  Denying any other concerns today and here for further evaluation and care.  Denies recent nausea, vomiting, fever, chills.      Allergies: Patient has no known allergies.   Current Outpatient Medications   Medication Sig Dispense Refill    cholecalciferol (VITAMIN D3) 1.25 MG (04012 UT) Oral Cap Take 1 capsule (50,000 Units total) by mouth once a week. 12 capsule 3    Isotretinoin 40 MG Oral Cap Take 1 capsule (40 mg total) by mouth daily. 30 capsule 0    Norgestimate-Eth Estradiol (SPRINTEC 28) 0.25-35 MG-MCG Oral Tab Take 1 tablet by mouth daily. 84 tablet 3      Past Medical History:   Diagnosis Date    Acne       Past Surgical History:   Procedure Laterality Date    OTHER SURGICAL HISTORY  March-July 2021    Mole removals      Family History   Problem Relation Age of Onset    Cancer Paternal Grandmother         Breast cancer    Cancer Maternal Grandfather         Lung cancer that metastasized to the brain      Social History     Socioeconomic History    Marital status: Single   Tobacco Use    Smoking status: Never    Smokeless tobacco: Never   Vaping Use    Vaping Use: Never used   Substance and Sexual Activity    Alcohol use: Yes     Alcohol/week: 2.0 standard drinks of  alcohol     Types: 1 Glasses of wine, 1 Standard drinks or equivalent per week     Comment: socially    Drug use: No   Other Topics Concern    Grew up on a farm No    History of tanning No    Outdoor occupation No    Breast feeding No    Reaction to local anesthetic No    Pt has a pacemaker No    Pt has a defibrillator No           REVIEW OF SYSTEMS:     10 point ROS completed and was negative, except for pertinent positive and negatives stated in subjective.       EXAM:     GENERAL: well developed, well nourished, in no apparent distress  EXTREMITIES:  1. Integument: Bilateral borders of right hallux toenail does appear to be ingrowing (lateral worse than medial).  There is no current erythema, purulent drainage, or other signs of infection.  The toenail itself is cut back fairly short.  Remaining toenails x 9 are of normal morphology and adequate length..  Skin appears moist, warm, and supple with positive hair growth. There are no color changes. No open lesions. No macerations. No Hyperkeratotic lesions.   2. Vascular: Dorsalis pedis 2/4 bilateral and posterior tibial pulses 2/4 bilateral, capillary refill normal.  3. Neurological: Gross sensation intact via light touch bilaterally.  Normal sharp/dull sensation  4. Musculoskeletal: Mild discomfort with palpation to lateral border of right hallux today.  All muscle groups are graded 5/5 in the foot and ankle.       ASSESSMENT AND PLAN:   Diagnoses and all orders for this visit:    Ingrowing nail, right great toe    Toe pain, right        Plan:   -Patient was seen and evaluated today in clinic.  Chart history reviewed.    Treatment options reviewed with the patient related to condition/diagnosis.  Because patient continues to have intermittent pain in the lateral border of the right hallux with what appears to be an ingrowing new toenail, recommend partial nail avulsion today.  Discussed advantages and disadvantages as well as risks/potential complications related  to nail surgery.  Patient would like a chemical matrixectomy to prevent the ingrown from recurring  Risk of recurrence explained. All questions answered, informed consent reviewed and pt agrees to proceed.  PROCEDURE: 00313 partial nail avulsion with chemical matrixectomy to lateral border of the right hallux  Local infiltration was given consisting of 5 cc of a 1 :1 mixture of 0.5% marcaine plain and 1% lidocaine plain  Betadine prep was preformed to the affected toe followed by proper sterile draping.  After confirming full anesthetic effect to the proper site, the nail fold was released from the lateral border(s) of the toe.  An english anvil was used to initiate the linear splitting of the nail border(s) and completed with a #62 blade.  A hemostat was used to completely resect the offending nail border(s). Curettage was performed to the nail bed and exposed nail matrix. At that time, 3 - 30 second applications of Phenol was applied to the exposed nail matrix using cotton tip applicators. The site was lightly irrigated with alcohol and a moist sterile compressive dressing was applied.  Pt tolerated procedure well with no complications encountered.  Written instructions have been given and reviewed with pt regarding after care.    -The patient indicates understanding of these issues and agrees to the plan.    Time spent reviewing pertinent information from patient's chart, reviewing any pertinent imaging, obtaining history and physical exam, discussing and mutually agreeing on a treatment plan, and documenting encounter: 25 minutes    RTC 2 weeks      Bert Epperson DPM        4/5/2024    Dragon speech recognition software was used to prepare this note.  Errors in word recognition may occur.  Please contact me with any questions/concerns with this note.

## 2024-04-05 NOTE — PROGRESS NOTES
Per verbal order from Dr Epperson, draw up 2.5mls of 1% lidocaine and 2.5mls of Marcaine 0.5%, for Right hallux lateral border partial nail avulsion with chemical matrixectomy

## 2024-04-19 ENCOUNTER — OFFICE VISIT (OUTPATIENT)
Facility: LOCATION | Age: 29
End: 2024-04-19

## 2024-04-19 DIAGNOSIS — Z98.890 STATUS POST NAIL SURGERY: Primary | ICD-10-CM

## 2024-04-19 PROCEDURE — 99212 OFFICE O/P EST SF 10 MIN: CPT | Performed by: PODIATRIST

## 2024-04-19 NOTE — PROGRESS NOTES
[facility]  Progress Note    Alessia Bhatti is a 29 year old female.   Chief Complaint   Patient presents with    Follow - Up     R hallux f/u - Pt had partial nail avulsion with chemical matrixectomy. Pt healing well. No pain or sign of infrction         HPI:     Patient is a pleasant 29-year-old female who is returning to clinic today following an ingrown toenail procedure to the outside border of her right great toenail.  Patient states that she is doing well overall.  She states that she has no pain.  She did have hypertrophied skin due to the ingrowing toenail, which she states flaked off and she has denied noticing any signs of recent infection.  She has been continuing to keep it covered with topical antibiotic and Band-Aid.  She has no other concerns at this time and here for further evaluation and care.  Denies recent nausea, vomiting, fever, chills.      Allergies: Patient has no known allergies.   Current Outpatient Medications   Medication Sig Dispense Refill    cholecalciferol (VITAMIN D3) 1.25 MG (80217 UT) Oral Cap Take 1 capsule (50,000 Units total) by mouth once a week. 12 capsule 3    Isotretinoin 40 MG Oral Cap Take 1 capsule (40 mg total) by mouth daily. 30 capsule 0    Norgestimate-Eth Estradiol (SPRINTEC 28) 0.25-35 MG-MCG Oral Tab Take 1 tablet by mouth daily. 84 tablet 3      Past Medical History:    Acne      Past Surgical History:   Procedure Laterality Date    Other surgical history  March-July 2021    Mole removals      Family History   Problem Relation Age of Onset    Cancer Paternal Grandmother         Breast cancer    Cancer Maternal Grandfather         Lung cancer that metastasized to the brain      Social History     Socioeconomic History    Marital status: Single   Tobacco Use    Smoking status: Never    Smokeless tobacco: Never   Vaping Use    Vaping status: Never Used   Substance and Sexual Activity    Alcohol use: Yes     Alcohol/week: 2.0 standard drinks of  alcohol     Types: 1 Glasses of wine, 1 Standard drinks or equivalent per week     Comment: socially    Drug use: No   Other Topics Concern    Grew up on a farm No    History of tanning No    Outdoor occupation No    Breast feeding No    Reaction to local anesthetic No    Pt has a pacemaker No    Pt has a defibrillator No           REVIEW OF SYSTEMS:     10 point ROS completed and was negative, except for pertinent positive and negatives stated in subjective.       EXAM:     GENERAL: well developed, well nourished, in no apparent distress  EXTREMITIES:  1. Integument: Right hallux lateral nail border status post partial nail avulsion with chemical matrixectomy.  Site looks stable with some fibers/nonviable tissue noted.  There is no surrounding erythema, purulent drainage, or other signs of infection currently.  Remaining toenails are of normal morphology and adequate length.  Skin appears moist, warm, and supple with positive hair growth. There are no color changes. No open lesions. No macerations. No Hyperkeratotic lesions.   2. Vascular: Dorsalis pedis 2/4 bilateral and posterior tibial pulses 2/4 bilateral, capillary refill normal.  3. Neurological: Gross sensation intact via light touch bilaterally.  Normal sharp/dull sensation  4. Musculoskeletal: No pain with palpation to site of partial nail avulsion to the right hallux.  Muscle strength deferred.  Compartments of the foot are soft and compressible.       ASSESSMENT AND PLAN:   Diagnoses and all orders for this visit:    Status post nail surgery        Plan:   -Patient was seen and evaluated today in clinic.  Chart history reviewed.    Patient was seen and evaluated today in clinic.  They are status post partial nail avulsion with chemical matrixectomy to lateral border of right great toe.  No current signs of infection noted.    Minor debridement was performed today utilizing a dermal curette to all nonviable tissue.    Patient to discontinue daily foot  soaks, antibiotic cream, and Band-Aids at this time.  Okay for patient to utilize Band-Aid when in shoe gear for added protection.  Encouraged to allow site to air out.    All of patient's questions were addressed and they will contact the office with any concerns in the future.    -The patient indicates understanding of these issues and agrees to the plan.    Time spent reviewing pertinent information from patient's chart, reviewing any pertinent imaging, obtaining history and physical exam, discussing and mutually agreeing on a treatment plan, and documenting encounter: 15 minutes    RTC as needed      Bert Epperson DPM        4/19/2024    Dragon speech recognition software was used to prepare this note.  Errors in word recognition may occur.  Please contact me with any questions/concerns with this note.

## 2024-04-24 ENCOUNTER — LAB ENCOUNTER (OUTPATIENT)
Dept: LAB | Age: 29
End: 2024-04-24
Attending: STUDENT IN AN ORGANIZED HEALTH CARE EDUCATION/TRAINING PROGRAM
Payer: MEDICAID

## 2024-04-24 DIAGNOSIS — L70.8 OTHER ACNE: ICD-10-CM

## 2024-04-24 DIAGNOSIS — L90.5 ACNE SCARRING: ICD-10-CM

## 2024-04-24 DIAGNOSIS — L70.0 NODULOCYSTIC ACNE: ICD-10-CM

## 2024-04-24 DIAGNOSIS — Z51.81 MEDICATION MONITORING ENCOUNTER: ICD-10-CM

## 2024-04-24 DIAGNOSIS — L70.0 ACNE VULGARIS: ICD-10-CM

## 2024-04-24 LAB
ALT SERPL-CCNC: 12 U/L
AST SERPL-CCNC: 18 U/L (ref ?–34)
B-HCG UR QL: NEGATIVE
TRIGL SERPL-MCNC: 47 MG/DL (ref 30–149)

## 2024-04-24 PROCEDURE — 84460 ALANINE AMINO (ALT) (SGPT): CPT

## 2024-04-24 PROCEDURE — 36415 COLL VENOUS BLD VENIPUNCTURE: CPT

## 2024-04-24 PROCEDURE — 84478 ASSAY OF TRIGLYCERIDES: CPT

## 2024-04-24 PROCEDURE — 81025 URINE PREGNANCY TEST: CPT

## 2024-04-24 PROCEDURE — 84450 TRANSFERASE (AST) (SGOT): CPT

## 2024-04-24 RX ORDER — ISOTRETINOIN 40 MG/1
40 CAPSULE ORAL DAILY
Qty: 30 CAPSULE | Refills: 0 | Status: SHIPPED | OUTPATIENT
Start: 2024-04-24 | End: 2024-04-25

## 2024-04-24 NOTE — TELEPHONE ENCOUNTER
Refill Request for medication(s):   Isotretinoin 40 MG Oral Cap     Last Office Visit:    Last Refill: 01/18/24    Pharmacy, Dosage verified: [id number] [place] - [address] [phone], [phone]     Condition Update (if applicable):     Pt has no breakouts. Labs done today. Am I ok to confirm in IPLEDGE?  F/U appt 4/25    Rx pended and sent to provider for approval, please advise. Thank You!

## 2024-04-25 ENCOUNTER — OFFICE VISIT (OUTPATIENT)
Dept: DERMATOLOGY CLINIC | Facility: CLINIC | Age: 29
End: 2024-04-25
Payer: MEDICAID

## 2024-04-25 DIAGNOSIS — L70.0 ACNE VULGARIS: ICD-10-CM

## 2024-04-25 DIAGNOSIS — Z51.81 MEDICATION MONITORING ENCOUNTER: Primary | ICD-10-CM

## 2024-04-25 PROCEDURE — 99214 OFFICE O/P EST MOD 30 MIN: CPT | Performed by: STUDENT IN AN ORGANIZED HEALTH CARE EDUCATION/TRAINING PROGRAM

## 2024-04-25 RX ORDER — ISOTRETINOIN 30 MG/1
60 CAPSULE ORAL DAILY
Qty: 60 CAPSULE | Refills: 0 | Status: SHIPPED | OUTPATIENT
Start: 2024-04-25

## 2024-04-25 RX ORDER — KETOCONAZOLE 20 MG/ML
SHAMPOO TOPICAL
Qty: 120 ML | Refills: 11 | Status: SHIPPED | OUTPATIENT
Start: 2024-04-25

## 2024-04-25 NOTE — PROGRESS NOTES
April 25th, 2024    Established Patient    CHIEF COMPLAINT: Acne F/U    HISTORY OF PRESENT ILLNESS: .    1. Acne  Location: Face   Duration: years  Signs and symptoms: No new breakouts and seeing much improvement. Minor dryness  Current treatment: Isotretinion 40 MG once a day  Past treatments: clindamycin; Tazarotene 0.05 % Cream and dapzone cream      DERM HISTORY:  History of skin cancer: No  History of chronic skin disease/condition: No    FAMILY HISTORY:  History of melanoma: No  History of chronic skin disease/condition: No    History/Other:    REVIEW OF SYSTEMS:  Constitutional: Denies fever, chills, unintentional weight loss.   Skin as per HPI    PAST MEDICAL HISTORY:  Past Medical History:    Acne       Medications  Current Outpatient Medications   Medication Sig Dispense Refill    Isotretinoin 40 MG Oral Cap Take 1 capsule (40 mg total) by mouth daily. 30 capsule 0    cholecalciferol (VITAMIN D3) 1.25 MG (41866 UT) Oral Cap Take 1 capsule (50,000 Units total) by mouth once a week. 12 capsule 3    Norgestimate-Eth Estradiol (SPRINTEC 28) 0.25-35 MG-MCG Oral Tab Take 1 tablet by mouth daily. 84 tablet 3       Objective:    PHYSICAL EXAM:  General: awake, alert, no acute distress  Skin: Skin exam was performed today including the following: face. Pertinent findings include:   - with erythematous papules    ASSESSMENT & PLAN:  Pathophysiology of diagnoses discussed with patient.  Therapeutic options reviewed. Risks, benefits, and alternatives discussed with patient. Instructions reviewed at length.    #Acne Vulgaris, nodulocystic - 10lb weight gain since starting OCP  #Acne, nodulocystic  - Target dose ~150-200 mg/kg = [id number]  - Current cumulative dose is 60 mg.  - Continue isotretinoin at 60 mg daily    #Medication monitoring encounter  #On Accutane therapy   - ALT/AST, fasting lipid panel dated 4/24 reviewed and within acceptable limits - patient to be confirmed in ipledge today   - Pregnancy test negative  on 4/24  - Repeat ALT/AST, fasting lipid panel and qualitative pregnancy test prior to next visit  - Confirmed to forms of contraception, primary is OCP with backup of condoms. Reviewed that patient must be on remain on chosen forms of contraception while on the medication and continue 1 month after stopping.   - Patient agrees not to donate blood or share medication while on medication and for 1 month after     #Cheilitis  - Recommend liberal use of Vaseline and/or Aquaphor to lips multiple times per day    #Xerosis  - Reviewed principles of dry skin care  - Recommend frequent application of emollients/moisturizers with built-in sunscreen of SPF 30+      Return to clinic: 1 month or sooner if something concerning arises     Rashel Gutierrez MD

## 2024-04-26 ENCOUNTER — TELEPHONE (OUTPATIENT)
Dept: DERMATOLOGY CLINIC | Facility: CLINIC | Age: 29
End: 2024-04-26

## 2024-04-26 NOTE — TELEPHONE ENCOUNTER
Pt already aware this medication is not covered by her insurance and has been using a [employer] coupon for it.

## 2024-04-26 NOTE — TELEPHONE ENCOUNTER
Fax from [facility]    Denied for:Isotretinoin      placed fax in pa inbox

## 2024-05-20 ENCOUNTER — TELEPHONE (OUTPATIENT)
Dept: OBGYN CLINIC | Facility: CLINIC | Age: 29
End: 2024-05-20

## 2024-05-25 ENCOUNTER — LAB ENCOUNTER (OUTPATIENT)
Dept: LAB | Age: 29
End: 2024-05-25
Attending: STUDENT IN AN ORGANIZED HEALTH CARE EDUCATION/TRAINING PROGRAM

## 2024-05-25 DIAGNOSIS — Z51.81 MEDICATION MONITORING ENCOUNTER: ICD-10-CM

## 2024-05-25 DIAGNOSIS — L70.0 ACNE VULGARIS: ICD-10-CM

## 2024-05-25 DIAGNOSIS — L70.8 OTHER ACNE: ICD-10-CM

## 2024-05-25 LAB
ALT SERPL-CCNC: 13 U/L
AST SERPL-CCNC: 20 U/L (ref ?–34)
B-HCG UR QL: NEGATIVE
TRIGL SERPL-MCNC: 44 MG/DL (ref 30–149)

## 2024-05-25 PROCEDURE — 81025 URINE PREGNANCY TEST: CPT

## 2024-05-25 PROCEDURE — 84450 TRANSFERASE (AST) (SGOT): CPT

## 2024-05-25 PROCEDURE — 36415 COLL VENOUS BLD VENIPUNCTURE: CPT

## 2024-05-25 PROCEDURE — 84460 ALANINE AMINO (ALT) (SGPT): CPT

## 2024-05-25 PROCEDURE — 84478 ASSAY OF TRIGLYCERIDES: CPT

## 2024-05-25 RX ORDER — ISOTRETINOIN 30 MG/1
60 CAPSULE ORAL DAILY
Qty: 60 CAPSULE | Refills: 0 | Status: CANCELLED | OUTPATIENT
Start: 2024-05-25

## 2024-05-25 RX ORDER — NORGESTIMATE AND ETHINYL ESTRADIOL 0.25-0.035
1 KIT ORAL DAILY
Qty: 84 TABLET | Refills: 3 | Status: CANCELLED | OUTPATIENT
Start: 2024-05-25 | End: 2025-05-25

## 2024-05-28 DIAGNOSIS — L70.0 ACNE VULGARIS: Primary | ICD-10-CM

## 2024-05-28 RX ORDER — ISOTRETINOIN 30 MG/1
60 CAPSULE ORAL DAILY
Qty: 60 CAPSULE | Refills: 0 | Status: SHIPPED | OUTPATIENT
Start: 2024-05-28

## 2024-05-28 NOTE — TELEPHONE ENCOUNTER
Dr. Gutierrez - disregard please, addressed in result note. Pt has refills on file for OCP, discussed with pt. She has been managed.

## 2024-05-28 NOTE — TELEPHONE ENCOUNTER
Dr. Gutierrez how do we handle this request, pt seen 4/25/24, upcoming appt 6/3/2024, pt unable to come in prior.         Component  Ref Range & Units 5/25/24  2:31 PM   HCG Urine Qualitative  Negative Negative

## 2024-05-28 NOTE — PROGRESS NOTES
Pt managed successfully. Patient informed of test results and all DM' recommendations/RX sent. Voiced understanding.

## 2024-05-31 ENCOUNTER — NURSE ONLY (OUTPATIENT)
Dept: OBGYN CLINIC | Facility: CLINIC | Age: 29
End: 2024-05-31

## 2024-05-31 VITALS — SYSTOLIC BLOOD PRESSURE: 121 MMHG | DIASTOLIC BLOOD PRESSURE: 82 MMHG

## 2024-05-31 DIAGNOSIS — Z23 NEED FOR VACCINATION AGAINST HUMAN PAPILLOMAVIRUS: Primary | ICD-10-CM

## 2024-05-31 LAB
CONTROL LINE PRESENT WITH A CLEAR BACKGROUND (YES/NO): YES YES/NO
KIT LOT #: NORMAL NUMERIC
PREGNANCY TEST, URINE: NEGATIVE

## 2024-05-31 PROCEDURE — 81025 URINE PREGNANCY TEST: CPT | Performed by: STUDENT IN AN ORGANIZED HEALTH CARE EDUCATION/TRAINING PROGRAM

## 2024-05-31 PROCEDURE — 90471 IMMUNIZATION ADMIN: CPT | Performed by: STUDENT IN AN ORGANIZED HEALTH CARE EDUCATION/TRAINING PROGRAM

## 2024-05-31 PROCEDURE — 90651 9VHPV VACCINE 2/3 DOSE IM: CPT | Performed by: STUDENT IN AN ORGANIZED HEALTH CARE EDUCATION/TRAINING PROGRAM

## 2024-06-03 ENCOUNTER — OFFICE VISIT (OUTPATIENT)
Dept: DERMATOLOGY CLINIC | Facility: CLINIC | Age: 29
End: 2024-06-03
Payer: MEDICAID

## 2024-06-03 DIAGNOSIS — Z51.81 MEDICATION MONITORING ENCOUNTER: ICD-10-CM

## 2024-06-03 DIAGNOSIS — L70.0 ACNE VULGARIS: Primary | ICD-10-CM

## 2024-06-03 PROCEDURE — 99214 OFFICE O/P EST MOD 30 MIN: CPT | Performed by: STUDENT IN AN ORGANIZED HEALTH CARE EDUCATION/TRAINING PROGRAM

## 2024-06-25 ENCOUNTER — LAB ENCOUNTER (OUTPATIENT)
Dept: LAB | Age: 29
End: 2024-06-25
Attending: STUDENT IN AN ORGANIZED HEALTH CARE EDUCATION/TRAINING PROGRAM

## 2024-06-25 DIAGNOSIS — L70.8 OTHER ACNE: ICD-10-CM

## 2024-06-25 DIAGNOSIS — Z51.81 MEDICATION MONITORING ENCOUNTER: ICD-10-CM

## 2024-06-25 DIAGNOSIS — L70.0 ACNE VULGARIS: ICD-10-CM

## 2024-06-25 LAB
ALT SERPL-CCNC: 14 U/L
AST SERPL-CCNC: 21 U/L (ref ?–34)
B-HCG UR QL: NEGATIVE
TRIGL SERPL-MCNC: 52 MG/DL (ref 30–149)

## 2024-06-25 PROCEDURE — 84460 ALANINE AMINO (ALT) (SGPT): CPT

## 2024-06-25 PROCEDURE — 84478 ASSAY OF TRIGLYCERIDES: CPT

## 2024-06-25 PROCEDURE — 81025 URINE PREGNANCY TEST: CPT

## 2024-06-25 PROCEDURE — 36415 COLL VENOUS BLD VENIPUNCTURE: CPT

## 2024-06-25 PROCEDURE — 84450 TRANSFERASE (AST) (SGOT): CPT

## 2024-06-26 DIAGNOSIS — L70.0 ACNE VULGARIS: ICD-10-CM

## 2024-06-26 DIAGNOSIS — Z51.81 MEDICATION MONITORING ENCOUNTER: ICD-10-CM

## 2024-06-26 RX ORDER — ISOTRETINOIN 30 MG/1
60 CAPSULE ORAL DAILY
Qty: 60 CAPSULE | Refills: 0 | OUTPATIENT
Start: 2024-06-26

## 2024-06-26 RX ORDER — ISOTRETINOIN 30 MG/1
60 CAPSULE ORAL DAILY
Qty: 60 CAPSULE | Refills: 0 | Status: SHIPPED | OUTPATIENT
Start: 2024-06-26

## 2024-06-28 NOTE — TELEPHONE ENCOUNTER
Pt has been using [employer] coupon for this medication.  Pt aware this is not covered by insurance.

## 2024-07-03 ENCOUNTER — OFFICE VISIT (OUTPATIENT)
Dept: OBGYN CLINIC | Facility: CLINIC | Age: 29
End: 2024-07-03
Payer: MEDICAID

## 2024-07-03 VITALS
SYSTOLIC BLOOD PRESSURE: 132 MMHG | BODY MASS INDEX: 26.05 KG/M2 | HEIGHT: 63 IN | DIASTOLIC BLOOD PRESSURE: 85 MMHG | HEART RATE: 79 BPM | WEIGHT: 147 LBS

## 2024-07-03 DIAGNOSIS — L72.0 EPIDERMOID CYST OF SKIN OF INGUINAL REGION: Primary | ICD-10-CM

## 2024-07-03 PROCEDURE — 99213 OFFICE O/P EST LOW 20 MIN: CPT | Performed by: STUDENT IN AN ORGANIZED HEALTH CARE EDUCATION/TRAINING PROGRAM

## 2024-07-03 RX ORDER — CEPHALEXIN 500 MG/1
500 CAPSULE ORAL 4 TIMES DAILY
Qty: 28 CAPSULE | Refills: 0 | Status: SHIPPED | OUTPATIENT
Start: 2024-07-03 | End: 2024-07-10

## 2024-07-03 NOTE — PROGRESS NOTES
[facility]  Obstetrics and Gynecology  Gyne Problem Visit      Alessia Bhatti is a 29 year old female  concerns of infected ingrown hair in the groin for the last month. She states it will occasionally become inflamed and painful but then go away. No drainage, swelling, or redness noted. States inflamed and painful, no drainage.  Today cyst feels firm with mild tenderness. She has been putting topical cream, but does not think it was antibacterial. Does not think cyst has gotten bigger in size but has not gone away. No history of  HSV. No fevers. No abnormal vaginal discharge, odor, irritation, or itching.    Patient's last menstrual period was 2024 (exact date).     Pap: 3/2024  Contraception:None    OBSTETRICS HISTORY:  OB History    Para Term  AB Living   0 0 0 0 0 0   SAB IAB Ectopic Multiple Live Births   0 0 0 0 0       GYNE HISTORY:      Menarche: 12 y/o (3/22/2024  9:01 AM)  Period Cycle (Days): 28 days (3/22/2024  9:01 AM)  Period Duration (Days): 5 days (3/22/2024  9:01 AM)  Period Flow: varies (3/22/2024  9:01 AM)  Use of Birth Control (if yes, specify type): OCP (3/22/2024  9:01 AM)  Hx Prior Abnormal Pap: No (3/22/2024  9:01 AM)  Pap Date: 21 (3/22/2024  9:01 AM)  Pap Result Notes: Normal (3/22/2024  9:01 AM)        Latest Ref Rng & Units 3/22/2024    10:05 AM 2021     2:30 PM   RECENT PAP RESULTS   Thinprep Pap Negative for intraepithelial lesion or malignancy Negative for intraepithelial lesion or malignancy  Negative for intraepithelial lesion or malignancy    HPV Negative  Negative          History   Sexual Activity    Sexual activity: Not on file       MEDICAL HISTORY:  Past Medical History:   Diagnosis Date    Acne      Past Surgical History:   Procedure Laterality Date    Other surgical history  March-2021    Mole removals       SOCIAL HISTORY:  Social History     Socioeconomic History    Marital status: Single     Spouse name: Not on file    Number of  children: Not on file    Years of education: Not on file    Highest education level: Not on file   Occupational History    Not on file   Tobacco Use    Smoking status: Never    Smokeless tobacco: Never   Vaping Use    Vaping status: Never Used   Substance and Sexual Activity    Alcohol use: Yes     Alcohol/week: 2.0 standard drinks of alcohol     Types: 1 Glasses of wine, 1 Standard drinks or equivalent per week     Comment: socially    Drug use: No    Sexual activity: Not on file   Other Topics Concern    Grew up on a farm No    History of tanning No    Outdoor occupation No    Breast feeding No    Reaction to local anesthetic No    Pt has a pacemaker No    Pt has a defibrillator No   Social History Narrative    Not on file     Social Determinants of Health     Financial Resource Strain: Low Risk  (7/7/2022)    [facility]    Overall Financial Resource Strain (CARDIA)     Difficulty of Paying Living Expenses: Not hard at all   Food Insecurity: No Food Insecurity (7/7/2022)    [facility]    Hunger Vital Sign     Worried About Running Out of Food in the Last Year: Never true     Ran Out of Food in the Last Year: Never true   Transportation Needs: No Transportation Needs (7/7/2022)    [facility]    PRAPARE - Transportation     Lack of Transportation (Medical): No     Lack of Transportation (Non-Medical): No   Physical Activity: Not on file   Stress: Not on file   Social Connections: Not on file   Housing Stability: Not on file       MEDICATIONS:    Current Outpatient Medications:     cephalexin 500 MG Oral Cap, Take 1 capsule (500 mg total) by mouth 4 (four) times daily for 7 days., Disp: 28 capsule, Rfl: 0    Norgestimate-Eth Estradiol (SPRINTEC 28) 0.25-35 MG-MCG Oral Tab, Take 1 tablet by mouth daily., Disp:  84 tablet, Rfl: 3    Isotretinoin 30 MG Oral Cap, Take 2 capsules (60 mg total) by mouth daily., Disp: 60 capsule, Rfl: 0    Isotretinoin 30 MG Oral Cap, Take 2 capsules (60 mg total) by mouth daily., Disp: 60 capsule, Rfl: 0    ketoconazole 2 % External Shampoo, Use 2-3 times weekly. Lather into scalp and leave on for 5 minutes before washing off., Disp: 120 mL, Rfl: 11    cholecalciferol (VITAMIN D3) 1.25 MG (79115 UT) Oral Cap, Take 1 capsule (50,000 Units total) by mouth once a week., Disp: 12 capsule, Rfl: 3    ALLERGIES:  No Known Allergies      REVIEW OF SYSTEMS:  Review of Systems   Constitutional:  Negative for appetite change, chills and fever.   Gastrointestinal:  Negative for abdominal pain, constipation, diarrhea, nausea and vomiting.   Genitourinary:  Positive for genital sores. Negative for difficulty urinating, dysuria, flank pain, frequency, hematuria, menstrual problem, pelvic pain, urgency, vaginal bleeding, vaginal discharge and vaginal pain.   Skin:  Negative for rash.   Neurological:  Negative for dizziness, light-headedness and headaches.       PHYSICAL EXAM:  /85 (BP Location: Right arm, Patient Position: Sitting, Cuff Size: adult)   Pulse 79   Ht 5' 3\" (1.6 m)   Wt 147 lb (66.7 kg)   LMP 06/08/2024 (Exact Date)   BMI 26.04 kg/m²     GENERAL: well developed, well nourished, in no apparent distress  ABDOMEN: Soft, non distended; non tender, no masses  GYNE/: External Genitalia: 2cm deep indurated mass felt in the left groin without purulent drainage, tenderness, erythema, or swelling. Urethral meatus appear wnl, no abnormal discharge or lesions noted.          Bladder: well supported, urethra wnl, no lesions or fissures                     Vagina: normal pink mucosa, no lesions, normal clear discharge.      ASSESSMENT:       ICD-10-CM    1. Epidermoid cyst of skin of inguinal region  L72.0           Plan:  - Possible epidermoid cyst vs. Furuncle, advised patient to apply warm  compresess 2-3 times daily for the next week. Will rx antibiotics, advised patient to try warm compresses for 1 week, if no improvement then she can take antitiotics. Advised she keep area dry and clean as much as possible. Recommend use of loose clothing at night. If symptoms worsen within next few days, she is to notify us. RTC in 1-2 weeks for follow-up.     Requested Prescriptions     Signed Prescriptions Disp Refills    cephalexin 500 MG Oral Cap 28 capsule 0     Sig: Take 1 capsule (500 mg total) by mouth 4 (four) times daily for 7 days.       RADHA KURTZ PA-C  9:15 AM  7/3/2024

## 2024-07-10 ENCOUNTER — OFFICE VISIT (OUTPATIENT)
Dept: DERMATOLOGY CLINIC | Facility: CLINIC | Age: 29
End: 2024-07-10
Payer: MEDICAID

## 2024-07-10 ENCOUNTER — LAB ENCOUNTER (OUTPATIENT)
Dept: LAB | Age: 29
End: 2024-07-10
Attending: STUDENT IN AN ORGANIZED HEALTH CARE EDUCATION/TRAINING PROGRAM
Payer: MEDICAID

## 2024-07-10 DIAGNOSIS — L70.0 ACNE VULGARIS: Primary | ICD-10-CM

## 2024-07-10 DIAGNOSIS — Z51.81 MEDICATION MONITORING ENCOUNTER: ICD-10-CM

## 2024-07-10 DIAGNOSIS — L70.0 ACNE VULGARIS: ICD-10-CM

## 2024-07-10 DIAGNOSIS — L70.8 OTHER ACNE: ICD-10-CM

## 2024-07-10 LAB
ALT SERPL-CCNC: 7 U/L
AST SERPL-CCNC: 15 U/L (ref ?–34)
B-HCG UR QL: NEGATIVE
TRIGL SERPL-MCNC: 60 MG/DL (ref 30–149)

## 2024-07-10 PROCEDURE — 84460 ALANINE AMINO (ALT) (SGPT): CPT

## 2024-07-10 PROCEDURE — 84478 ASSAY OF TRIGLYCERIDES: CPT

## 2024-07-10 PROCEDURE — 36415 COLL VENOUS BLD VENIPUNCTURE: CPT

## 2024-07-10 PROCEDURE — 81025 URINE PREGNANCY TEST: CPT

## 2024-07-10 PROCEDURE — 84450 TRANSFERASE (AST) (SGOT): CPT

## 2024-07-10 PROCEDURE — 99214 OFFICE O/P EST MOD 30 MIN: CPT | Performed by: STUDENT IN AN ORGANIZED HEALTH CARE EDUCATION/TRAINING PROGRAM

## 2024-07-10 RX ORDER — ISOTRETINOIN 30 MG/1
60 CAPSULE ORAL DAILY
Qty: 60 CAPSULE | Refills: 0 | Status: SHIPPED | OUTPATIENT
Start: 2024-07-10

## 2024-07-10 NOTE — PROGRESS NOTES
July 10, 2024    Established patient     CHIEF COMPLAINT: Acne F/u    HISTORY OF PRESENT ILLNESS:     *Completing Month 6 of Accutane (15 pills left)*    1. Acne  Location: Face   Duration: over 1 year  Condition Update: Acne is improving. Pt denies any side effects.  Current treatment: RX Isotretinoin 30 MG Oral Cap-bid (last script 24)    Last HS24  Last AST/ALT/Triglycerides: 24 WNL    DERM HISTORY:  History of skin cancer: No  History of chronic skin disease/condition: No    FAMILY HISTORY:  History of melanoma: No  History of chronic skin disease/condition: No    History/Other:    REVIEW OF SYSTEMS:  Constitutional: Denies fever, chills, unintentional weight loss.   Skin as per HPI    PAST MEDICAL HISTORY:  Past Medical History:    Acne       Medications  Current Outpatient Medications   Medication Sig Dispense Refill    cephalexin 500 MG Oral Cap Take 1 capsule (500 mg total) by mouth 4 (four) times daily for 7 days. 28 capsule 0    Isotretinoin 30 MG Oral Cap Take 2 capsules (60 mg total) by mouth daily. 60 capsule 0    Isotretinoin 30 MG Oral Cap Take 2 capsules (60 mg total) by mouth daily. 60 capsule 0    ketoconazole 2 % External Shampoo Use 2-3 times weekly. Lather into scalp and leave on for 5 minutes before washing off. 120 mL 11    cholecalciferol (VITAMIN D3) 1.25 MG (25809 UT) Oral Cap Take 1 capsule (50,000 Units total) by mouth once a week. 12 capsule 3    Norgestimate-Eth Estradiol (SPRINTEC 28) 0.25-35 MG-MCG Oral Tab Take 1 tablet by mouth daily. 84 tablet 3       Objective:    PHYSICAL EXAM:  General: awake, alert, no acute distress  Skin: Skin exam was performed today including the following: face. Pertinent findings include:   - clear today     ASSESSMENT & PLAN:  Pathophysiology of diagnoses discussed with patient.  Therapeutic options reviewed. Risks, benefits, and alternatives discussed with patient. Instructions reviewed at length.       #Acne Vulgaris, nodulocystic  - 10lb weight gain since starting OCP  #Acne, nodulocystic  - Target dose ~150-200 mg/kg = 82932aq  - Current cumulative dose is 5000 mg.  - Continue isotretinoin at 60 mg daily     #Medication monitoring encounter  #On Accutane therapy   - Repeat ALT/AST, fasting lipid panel and qualitative pregnancy test today  - Confirmed to forms of contraception, primary is OCP with backup of condoms. Reviewed that patient must be on remain on chosen forms of contraception while on the medication and continue 1 month after stopping.   - Patient agrees not to donate blood or share medication while on medication and for 1 month after      #Cheilitis  - Recommend liberal use of Vaseline and/or Aquaphor to lips multiple times per day     #Xerosis  - Reviewed principles of dry skin care  - Recommend frequent application of emollients/moisturizers with built-in sunscreen of SPF 30+        Return to clinic: 1 month or sooner if something concerning arises      Rashel Gutierrez MD

## 2024-07-12 ENCOUNTER — PATIENT MESSAGE (OUTPATIENT)
Dept: DERMATOLOGY CLINIC | Facility: CLINIC | Age: 29
End: 2024-07-12

## 2024-07-12 NOTE — TELEPHONE ENCOUNTER
From: Alessia Bhatti  To: Rashel Gutierrez  Sent: 7/12/2024 5:38 AM CDT  Subject: Reaction to Claritin D with Accutane     Helaleksandr Gutierrez,    Yesterday I took Claritin D for the first time since I’ve been on Accutane, and within an hour I started to notice some redness and a few blisters on my lips. Initially, I thought it might just be dryness/irritation from the Accutane so I just applied a thick layer of Aquaphor. It’s been 24 hours and my lips look worse. The majority of my lips are red and inflamed with small blisters. I did take my Accutane last night, but I’m planning on holding off until this reaction subsides. I’ve had on and off small areas of red irritated skin on my lips and flaking with the Accutane, but never anything like this. Other than my lips, I haven’t noticed any other symptoms.

## 2024-07-12 NOTE — TELEPHONE ENCOUNTER
Dr. Gutierrez - please see pt's message and photo.  Spoke with pt and she denies any other s/s of an allergic reaction, just localized to the lips.  Pt denies any pain or burning, just redness and blisters.  Pt asking if she should apply or take anything for the reaction?  Pt advised to stop the Claritin D and Accutane until further notice.  Please advise.  Thank you.

## 2024-07-16 ENCOUNTER — OFFICE VISIT (OUTPATIENT)
Dept: DERMATOLOGY CLINIC | Facility: CLINIC | Age: 29
End: 2024-07-16
Payer: MEDICAID

## 2024-07-16 DIAGNOSIS — R21 RASH AND NONSPECIFIC SKIN ERUPTION: Primary | ICD-10-CM

## 2024-07-16 PROCEDURE — 99214 OFFICE O/P EST MOD 30 MIN: CPT | Performed by: STUDENT IN AN ORGANIZED HEALTH CARE EDUCATION/TRAINING PROGRAM

## 2024-07-16 NOTE — PROGRESS NOTES
July 16, 2024    Established patient     CHIEF COMPLAINT: Lesion on lips    HISTORY OF PRESENT ILLNESS: .    1. Skin lesion  Location: Lips   Duration: 1 week  Signs and symptoms: Inflammation, blisters and swollen  Current treatment: Aquaphor  Past treatments: None      DERM HISTORY:  History of skin cancer: No  History of chronic skin disease/condition: No    FAMILY HISTORY:  History of melanoma: No  History of chronic skin disease/condition: No    History/Other:    REVIEW OF SYSTEMS:  Constitutional: Denies fever, chills, unintentional weight loss.   Skin as per HPI    PAST MEDICAL HISTORY:  Past Medical History:    Acne       Medications  Current Outpatient Medications   Medication Sig Dispense Refill    Isotretinoin 30 MG Oral Cap Take 2 capsules (60 mg total) by mouth daily. 60 capsule 0    Isotretinoin 30 MG Oral Cap Take 2 capsules (60 mg total) by mouth daily. 60 capsule 0    ketoconazole 2 % External Shampoo Use 2-3 times weekly. Lather into scalp and leave on for 5 minutes before washing off. 120 mL 11    cholecalciferol (VITAMIN D3) 1.25 MG (86610 UT) Oral Cap Take 1 capsule (50,000 Units total) by mouth once a week. 12 capsule 3    Norgestimate-Eth Estradiol (SPRINTEC 28) 0.25-35 MG-MCG Oral Tab Take 1 tablet by mouth daily. 84 tablet 3       Objective:    PHYSICAL EXAM:  General: awake, alert, no acute distress  Skin: Skin exam was performed today including the following: lips. Pertinent findings include:   - with healing erosions today     ASSESSMENT & PLAN:  Pathophysiology of diagnoses discussed with patient.  Therapeutic options reviewed. Risks, benefits, and alternatives discussed with patient. Instructions reviewed at length.    #Rash and nonspecific skin eruption  #High risk medication monitoring - Accutane  - Differential diagnosis includes phototoxicity vs initial HSV infection   - Healing today and recommended aquaphor in addition to using SPF 30+ lip balm when outdoors.   - Ok to restart  isotretinoin     Return to clinic: monthly for isotretinoin or sooner if something concerning arises     Rashel Gutierrez MD

## 2024-07-31 ENCOUNTER — OFFICE VISIT (OUTPATIENT)
Dept: OBGYN CLINIC | Facility: CLINIC | Age: 29
End: 2024-07-31
Payer: MEDICAID

## 2024-07-31 ENCOUNTER — LAB ENCOUNTER (OUTPATIENT)
Dept: LAB | Age: 29
End: 2024-07-31
Attending: STUDENT IN AN ORGANIZED HEALTH CARE EDUCATION/TRAINING PROGRAM
Payer: MEDICAID

## 2024-07-31 VITALS
BODY MASS INDEX: 26 KG/M2 | HEART RATE: 76 BPM | DIASTOLIC BLOOD PRESSURE: 78 MMHG | WEIGHT: 145 LBS | SYSTOLIC BLOOD PRESSURE: 121 MMHG

## 2024-07-31 DIAGNOSIS — Z11.3 SCREEN FOR STD (SEXUALLY TRANSMITTED DISEASE): Primary | ICD-10-CM

## 2024-07-31 DIAGNOSIS — L72.0 EPIDERMOID CYST OF SKIN OF INGUINAL REGION: ICD-10-CM

## 2024-07-31 DIAGNOSIS — Z11.3 SCREEN FOR STD (SEXUALLY TRANSMITTED DISEASE): ICD-10-CM

## 2024-07-31 PROCEDURE — 86696 HERPES SIMPLEX TYPE 2 TEST: CPT

## 2024-07-31 PROCEDURE — 36415 COLL VENOUS BLD VENIPUNCTURE: CPT

## 2024-07-31 PROCEDURE — 86695 HERPES SIMPLEX TYPE 1 TEST: CPT

## 2024-07-31 PROCEDURE — 99213 OFFICE O/P EST LOW 20 MIN: CPT | Performed by: STUDENT IN AN ORGANIZED HEALTH CARE EDUCATION/TRAINING PROGRAM

## 2024-07-31 NOTE — PROGRESS NOTES
[facility]  Obstetrics and Gynecology  Gyne Problem Visit      Alessia Bhatti is a 29 year old female  presenting for follow-up on a left inguinal lump. After our last visit patient completed course of oral antibiotics and felt like lesion had gotten smaller. Still reports minor tenderness. Location of lump is in area that is frequently irritated with clothing. No discharge from area. She is also noting blister like lesions appearing on her mouth. Lesions would appear and only last a day. Patient is on Accutane. She denies known history of HSV but is requesting blood work. Denies any current lesions. No fevers or chills. No nausea or vomiting.       Patient's last menstrual period was 2024 (exact date).     Pap: 3/2024  Contraception:OCP    OBSTETRICS HISTORY:  OB History    Para Term  AB Living   0 0 0 0 0 0   SAB IAB Ectopic Multiple Live Births   0 0 0 0 0       GYNE HISTORY:      Menarche: 12 y/o (3/22/2024  9:01 AM)  Period Cycle (Days): 28 days (3/22/2024  9:01 AM)  Period Duration (Days): 5 days (3/22/2024  9:01 AM)  Period Flow: varies (3/22/2024  9:01 AM)  Use of Birth Control (if yes, specify type): OCP (3/22/2024  9:01 AM)  Hx Prior Abnormal Pap: No (3/22/2024  9:01 AM)  Pap Date: 21 (3/22/2024  9:01 AM)  Pap Result Notes: Normal (3/22/2024  9:01 AM)        Latest Ref Rng & Units 3/22/2024    10:05 AM 2021     2:30 PM   RECENT PAP RESULTS   Thinprep Pap Negative for intraepithelial lesion or malignancy Negative for intraepithelial lesion or malignancy  Negative for intraepithelial lesion or malignancy    HPV Negative  Negative          History   Sexual Activity    Sexual activity: Not on file       MEDICAL HISTORY:  Past Medical History:   Diagnosis Date    Acne      Past Surgical History:   Procedure Laterality Date    Other surgical history  March-2021    Mole removals       SOCIAL HISTORY:  Social History     Socioeconomic History    Marital status: Single      Spouse name: Not on file    Number of children: Not on file    Years of education: Not on file    Highest education level: Not on file   Occupational History    Not on file   Tobacco Use    Smoking status: Never    Smokeless tobacco: Never   Vaping Use    Vaping status: Never Used   Substance and Sexual Activity    Alcohol use: Yes     Alcohol/week: 2.0 standard drinks of alcohol     Types: 1 Glasses of wine, 1 Standard drinks or equivalent per week     Comment: socially    Drug use: No    Sexual activity: Not on file   Other Topics Concern    Grew up on a farm No    History of tanning No    Outdoor occupation No    Breast feeding No    Reaction to local anesthetic No    Pt has a pacemaker No    Pt has a defibrillator No   Social History Narrative    Not on file     Social Determinants of Health     Financial Resource Strain: Low Risk  (7/7/2022)    [facility]    Overall Financial Resource Strain (CARDIA)     Difficulty of Paying Living Expenses: Not hard at all   Food Insecurity: No Food Insecurity (7/7/2022)    Received from [facility], [facility]    Hunger Vital Sign     Worried About Running Out of Food in the Last Year: Never true     Ran Out of Food in the Last Year: Never true   Transportation Needs: No Transportation Needs (7/7/2022)    Received from [facility], [facility]    PRAPARE - Transportation     Lack of Transportation (Medical): No     Lack of Transportation (Non-Medical): No   Physical Activity: Not on file   Stress: Not on file   Social Connections: Not on file   Housing Stability: Not on file       MEDICATIONS:    Current Outpatient Medications:     Isotretinoin 30 MG Oral Cap, Take 2 capsules (60 mg total) by mouth daily., Disp: 60 capsule, Rfl: 0    Isotretinoin 30 MG Oral Cap, Take 2 capsules (60 mg total) by mouth daily., Disp: 60  capsule, Rfl: 0    ketoconazole 2 % External Shampoo, Use 2-3 times weekly. Lather into scalp and leave on for 5 minutes before washing off., Disp: 120 mL, Rfl: 11    cholecalciferol (VITAMIN D3) 1.25 MG (31158 UT) Oral Cap, Take 1 capsule (50,000 Units total) by mouth once a week., Disp: 12 capsule, Rfl: 3    Norgestimate-Eth Estradiol (SPRINTEC 28) 0.25-35 MG-MCG Oral Tab, Take 1 tablet by mouth daily., Disp: 84 tablet, Rfl: 3    ALLERGIES:  No Known Allergies      REVIEW OF SYSTEMS:  Review of Systems   Constitutional:  Negative for appetite change, chills and fever.   HENT:  Positive for mouth sores.    Gastrointestinal:  Negative for abdominal pain, constipation, diarrhea, nausea and vomiting.   Genitourinary:  Positive for genital sores (lump in left inguinal area). Negative for difficulty urinating, dysuria, flank pain, frequency, hematuria, menstrual problem, pelvic pain, urgency, vaginal bleeding, vaginal discharge and vaginal pain.   Skin:  Negative for rash.   Neurological:  Negative for dizziness, light-headedness and headaches.       PHYSICAL EXAM:  /78 (BP Location: Right arm, Patient Position: Sitting, Cuff Size: adult)   Pulse 76   Wt 145 lb (65.8 kg)   LMP 07/07/2024 (Exact Date)   BMI 25.69 kg/m²     GENERAL: well developed, well nourished, in no apparent distress  ABDOMEN: Soft, non distended; non tender, no masses  GYNE/: External Genitalia: 1 cm firm mass felt with local erythema and mild tenderness in left inguinal area. No purulent discharge or swelling. Urethral meatus appear wnl, no abnormal discharge or lesions noted.      ASSESSMENT:       ICD-10-CM    1. Screen for STD (sexually transmitted disease)  Z11.3 HSV 1 & 2 Glycoprotein Specific AB,IGG      2. Epidermoid cyst of skin of inguinal region  L72.0           Plan:  - Patient to continue applying warm compresses to area and soak with luke warm water. Avoid wearing compressive clothing. If symptoms worsen or persist she  should notify us.   - HSV blood work ordered. If HSV 1 patient should start antivirals at the start of symptoms.       RADHA KURTZ PA-C  9:06 AM  7/31/2024

## 2024-08-02 LAB
HSV 1 GLYCOPROTEIN G, IGG: POSITIVE
HSV 2 GLYCOPROTEIN G, IGG: NEGATIVE

## 2024-08-02 RX ORDER — VALACYCLOVIR HYDROCHLORIDE 1 G/1
2000 TABLET, FILM COATED ORAL 2 TIMES DAILY
Qty: 4 TABLET | Refills: 1 | Status: SHIPPED | OUTPATIENT
Start: 2024-08-02 | End: 2024-08-03

## 2024-08-14 ENCOUNTER — OFFICE VISIT (OUTPATIENT)
Dept: DERMATOLOGY CLINIC | Facility: CLINIC | Age: 29
End: 2024-08-14
Payer: MEDICAID

## 2024-08-14 ENCOUNTER — LAB ENCOUNTER (OUTPATIENT)
Dept: LAB | Age: 29
End: 2024-08-14
Attending: STUDENT IN AN ORGANIZED HEALTH CARE EDUCATION/TRAINING PROGRAM
Payer: MEDICAID

## 2024-08-14 DIAGNOSIS — Z51.81 MEDICATION MONITORING ENCOUNTER: ICD-10-CM

## 2024-08-14 DIAGNOSIS — L70.0 ACNE VULGARIS: Primary | ICD-10-CM

## 2024-08-14 DIAGNOSIS — L70.8 OTHER ACNE: ICD-10-CM

## 2024-08-14 LAB
ALT SERPL-CCNC: 63 U/L
AST SERPL-CCNC: 82 U/L (ref ?–34)
B-HCG UR QL: NEGATIVE
TRIGL SERPL-MCNC: 38 MG/DL (ref 30–149)

## 2024-08-14 PROCEDURE — 81025 URINE PREGNANCY TEST: CPT

## 2024-08-14 PROCEDURE — 36415 COLL VENOUS BLD VENIPUNCTURE: CPT

## 2024-08-14 PROCEDURE — 84450 TRANSFERASE (AST) (SGOT): CPT

## 2024-08-14 PROCEDURE — 84460 ALANINE AMINO (ALT) (SGPT): CPT

## 2024-08-14 PROCEDURE — 99214 OFFICE O/P EST MOD 30 MIN: CPT | Performed by: STUDENT IN AN ORGANIZED HEALTH CARE EDUCATION/TRAINING PROGRAM

## 2024-08-14 PROCEDURE — 84478 ASSAY OF TRIGLYCERIDES: CPT

## 2024-08-14 NOTE — PROGRESS NOTES
August 14, 2024    Established patient     Referred by:   No referring provider defined for this encounter.     CHIEF COMPLAINT: Accutane f/u    HISTORY OF PRESENT ILLNESS: .    1. Acne  Location: Face   Duration: 1-2 years  Signs and symptoms: Improvement  Current treatment: Isotretinoin 30 MG Oral Cap   Past treatments: Same as above      DERM HISTORY:  History of skin cancer: No  History of chronic skin disease/condition: No    FAMILY HISTORY:  History of melanoma: No  History of chronic skin disease/condition: No    History/Other:    REVIEW OF SYSTEMS:  Constitutional: Denies fever, chills, unintentional weight loss.   Skin as per HPI    PAST MEDICAL HISTORY:  Past Medical History:    Acne       Medications  Current Outpatient Medications   Medication Sig Dispense Refill    Isotretinoin 30 MG Oral Cap Take 2 capsules (60 mg total) by mouth daily. 60 capsule 0    Isotretinoin 30 MG Oral Cap Take 2 capsules (60 mg total) by mouth daily. 60 capsule 0    ketoconazole 2 % External Shampoo Use 2-3 times weekly. Lather into scalp and leave on for 5 minutes before washing off. 120 mL 11    cholecalciferol (VITAMIN D3) 1.25 MG (31419 UT) Oral Cap Take 1 capsule (50,000 Units total) by mouth once a week. 12 capsule 3    Norgestimate-Eth Estradiol (SPRINTEC 28) 0.25-35 MG-MCG Oral Tab Take 1 tablet by mouth daily. 84 tablet 3       Objective:    PHYSICAL EXAM:  General: awake, alert, no acute distress  Skin: Skin exam was performed today including the following: face. Pertinent findings include:   - clear today    ASSESSMENT & PLAN:  Pathophysiology of diagnoses discussed with patient.  Therapeutic options reviewed. Risks, benefits, and alternatives discussed with patient. Instructions reviewed at length.    ASSESSMENT & PLAN:  Pathophysiology of diagnoses discussed with patient.  Therapeutic options reviewed. Risks, benefits, and alternatives discussed with patient. Instructions reviewed at length.     #Acne Vulgaris,  nodulocystic - 10lb weight gain since starting OCP  #Acne, nodulocystic  - Target dose ~150-200 mg/kg = 43758bi  - Current cumulative dose is 6800 mg.  - Continue isotretinoin at 60 mg daily     #Medication monitoring encounter  #On Accutane therapy   - Repeat ALT/AST, fasting lipid panel and qualitative pregnancy test today  - Confirmed to forms of contraception, primary is OCP with backup of condoms. Reviewed that patient must be on remain on chosen forms of contraception while on the medication and continue 1 month after stopping.   - Patient agrees not to donate blood or share medication while on medication and for 1 month after      #Cheilitis  - Recommend liberal use of Vaseline and/or Aquaphor to lips multiple times per day     #Xerosis  - Reviewed principles of dry skin care  - Recommend frequent application of emollients/moisturizers with built-in sunscreen of SPF 30+        Return to clinic: monthly for isotretinoin or sooner if something concerning arises      Rashel Gutierrez MD     Return to clinic 1 month

## 2024-08-15 ENCOUNTER — TELEPHONE (OUTPATIENT)
Dept: DERMATOLOGY CLINIC | Facility: CLINIC | Age: 29
End: 2024-08-15

## 2024-08-15 DIAGNOSIS — L70.0 ACNE VULGARIS: ICD-10-CM

## 2024-08-15 NOTE — PROGRESS NOTES
Pt managed.  Prescription Window  Patient can obtain their prescription from:  August 14, 2024 - August 20, 2024 (7 - Day Prescription window)    [facility]

## 2024-08-15 NOTE — PROGRESS NOTES
Pt informed and states she did drink at her cousin's wedding.  Pt states she will  the medication but hold for 2 weeks and have ALT and AST re-checked in 2 weeks.

## 2024-08-16 RX ORDER — ISOTRETINOIN 30 MG/1
60 CAPSULE ORAL DAILY
Qty: 60 CAPSULE | Refills: 0 | Status: SHIPPED | OUTPATIENT
Start: 2024-08-16

## 2024-08-16 NOTE — TELEPHONE ENCOUNTER
Discussed with Dr. Gutierrez, verified ok to refill accutane at present dose 60mg daily - RX sent, s/w pt and stressed out to hold medication x2 weeks and recheck labs.     Dr. Gutierrez - pt also leaving country on 8/24 for 2 weeks. She will repeat labs 8/24 before her trip and is aware she'll need labs/HCG upon return.

## 2024-08-24 ENCOUNTER — LAB ENCOUNTER (OUTPATIENT)
Dept: LAB | Age: 29
End: 2024-08-24
Attending: STUDENT IN AN ORGANIZED HEALTH CARE EDUCATION/TRAINING PROGRAM
Payer: MEDICAID

## 2024-08-24 DIAGNOSIS — L70.8 OTHER ACNE: ICD-10-CM

## 2024-08-24 DIAGNOSIS — Z51.81 MEDICATION MONITORING ENCOUNTER: ICD-10-CM

## 2024-08-24 LAB
ALT SERPL-CCNC: 12 U/L
AST SERPL-CCNC: 19 U/L (ref ?–34)

## 2024-08-24 PROCEDURE — 84450 TRANSFERASE (AST) (SGOT): CPT

## 2024-08-24 PROCEDURE — 84460 ALANINE AMINO (ALT) (SGPT): CPT

## 2024-08-24 PROCEDURE — 36415 COLL VENOUS BLD VENIPUNCTURE: CPT

## 2024-08-29 ENCOUNTER — TELEPHONE (OUTPATIENT)
Dept: DERMATOLOGY CLINIC | Facility: CLINIC | Age: 29
End: 2024-08-29

## 2024-08-29 NOTE — PROGRESS NOTES
Pt informed of lab results and verbalized understanding.  Pt states she has upcoming appt on 9/12/24 and will do her hcg and triglycerides at that time.  Pt states she has enough medication to get her through until her appt.

## 2024-09-10 ENCOUNTER — LAB ENCOUNTER (OUTPATIENT)
Dept: LAB | Age: 29
End: 2024-09-10
Attending: STUDENT IN AN ORGANIZED HEALTH CARE EDUCATION/TRAINING PROGRAM
Payer: MEDICAID

## 2024-09-10 DIAGNOSIS — Z51.81 MEDICATION MONITORING ENCOUNTER: ICD-10-CM

## 2024-09-10 DIAGNOSIS — L70.8 OTHER ACNE: ICD-10-CM

## 2024-09-10 LAB
ALT SERPL-CCNC: 7 U/L
AST SERPL-CCNC: 14 U/L (ref ?–34)
B-HCG UR QL: NEGATIVE
TRIGL SERPL-MCNC: 40 MG/DL (ref 30–149)

## 2024-09-10 PROCEDURE — 36415 COLL VENOUS BLD VENIPUNCTURE: CPT

## 2024-09-10 PROCEDURE — 84450 TRANSFERASE (AST) (SGOT): CPT

## 2024-09-10 PROCEDURE — 84460 ALANINE AMINO (ALT) (SGPT): CPT

## 2024-09-11 DIAGNOSIS — L70.0 ACNE VULGARIS: ICD-10-CM

## 2024-09-11 RX ORDER — ISOTRETINOIN 30 MG/1
60 CAPSULE ORAL DAILY
Qty: 60 CAPSULE | Refills: 0 | Status: SHIPPED | OUTPATIENT
Start: 2024-09-11 | End: 2024-09-12

## 2024-09-11 NOTE — PROGRESS NOTES
Pt managed on ipledge and [employer] msg sent.    Prescription Window  Patient can obtain their prescription from:  September 10, 2024 - September 16, 2024 (7 - Day Prescription window)

## 2024-09-12 ENCOUNTER — OFFICE VISIT (OUTPATIENT)
Dept: DERMATOLOGY CLINIC | Facility: CLINIC | Age: 29
End: 2024-09-12
Payer: MEDICAID

## 2024-09-12 DIAGNOSIS — L70.0 ACNE VULGARIS: Primary | ICD-10-CM

## 2024-09-12 DIAGNOSIS — Z51.81 MEDICATION MONITORING ENCOUNTER: ICD-10-CM

## 2024-09-12 PROCEDURE — 99214 OFFICE O/P EST MOD 30 MIN: CPT | Performed by: STUDENT IN AN ORGANIZED HEALTH CARE EDUCATION/TRAINING PROGRAM

## 2024-09-12 RX ORDER — ISOTRETINOIN 30 MG/1
60 CAPSULE ORAL DAILY
Qty: 60 CAPSULE | Refills: 0 | Status: SHIPPED | OUTPATIENT
Start: 2024-09-12

## 2024-09-12 NOTE — PROGRESS NOTES
September 12, 2024    Established patient     Referred by:   No referring provider defined for this encounter.     CHIEF COMPLAINT: Accutane f/u    HISTORY OF PRESENT ILLNESS: .    1. Acne  Location: Face   Duration: 1-2 years  Signs and symptoms: Improvement, normal dryness  Current treatment: Isotretinoin 30 MG Oral Cap   Past treatments: Same as above    DERM HISTORY:  History of skin cancer: No  History of chronic skin disease/condition: No    FAMILY HISTORY:  History of melanoma: No  History of chronic skin disease/condition: No    History/Other:    REVIEW OF SYSTEMS:  Constitutional: Denies fever, chills, unintentional weight loss.   Skin as per HPI    PAST MEDICAL HISTORY:  Past Medical History:    Acne       Medications  Current Outpatient Medications   Medication Sig Dispense Refill    Isotretinoin 30 MG Oral Cap Take 2 capsules (60 mg total) by mouth daily. 60 capsule 0    Isotretinoin 30 MG Oral Cap Take 2 capsules (60 mg total) by mouth daily. 60 capsule 0    ketoconazole 2 % External Shampoo Use 2-3 times weekly. Lather into scalp and leave on for 5 minutes before washing off. 120 mL 11    cholecalciferol (VITAMIN D3) 1.25 MG (51482 UT) Oral Cap Take 1 capsule (50,000 Units total) by mouth once a week. 12 capsule 3    Norgestimate-Eth Estradiol (SPRINTEC 28) 0.25-35 MG-MCG Oral Tab Take 1 tablet by mouth daily. 84 tablet 3       Objective:    PHYSICAL EXAM:  General: awake, alert, no acute distress  Skin: Skin exam was performed today including the following: face. Pertinent findings include:   - clear today    ASSESSMENT & PLAN:  Pathophysiology of diagnoses discussed with patient.  Therapeutic options reviewed. Risks, benefits, and alternatives discussed with patient. Instructions reviewed at length.    ASSESSMENT & PLAN:  Pathophysiology of diagnoses discussed with patient.  Therapeutic options reviewed. Risks, benefits, and alternatives discussed with patient. Instructions reviewed at length.      #Acne Vulgaris, nodulocystic - 10lb weight gain since starting OCP  #Acne, nodulocystic  - Target dose ~150-200 mg/kg = 75973gj  - Current cumulative dose is 8600 mg.  - Continue isotretinoin at 60 mg daily     #Medication monitoring encounter  #On Accutane therapy   - Repeat ALT/AST, fasting lipid panel and qualitative pregnancy test today  - Confirmed to forms of contraception, primary is OCP with backup of condoms. Reviewed that patient must be on remain on chosen forms of contraception while on the medication and continue 1 month after stopping.   - Patient agrees not to donate blood or share medication while on medication and for 1 month after      #Cheilitis  - Recommend liberal use of Vaseline and/or Aquaphor to lips multiple times per day     #Xerosis  - Reviewed principles of dry skin care  - Recommend frequent application of emollients/moisturizers with built-in sunscreen of SPF 30+        Return to clinic: monthly for isotretinoin or sooner if something concerning arises      Rashel Gutierrez MD     Return to clinic 1 month

## 2024-09-13 ENCOUNTER — OFFICE VISIT (OUTPATIENT)
Facility: LOCATION | Age: 29
End: 2024-09-13
Payer: MEDICAID

## 2024-09-13 VITALS — DIASTOLIC BLOOD PRESSURE: 76 MMHG | SYSTOLIC BLOOD PRESSURE: 134 MMHG

## 2024-09-13 DIAGNOSIS — M79.674 TOE PAIN, RIGHT: ICD-10-CM

## 2024-09-13 DIAGNOSIS — L60.0 INGROWING NAIL, RIGHT GREAT TOE: Primary | ICD-10-CM

## 2024-09-13 PROCEDURE — 11750 EXCISION NAIL&NAIL MATRIX: CPT | Performed by: PODIATRIST

## 2024-09-13 NOTE — PROGRESS NOTES
[facility]  Progress Note    Alessia Bhatti is a 29 year old female.   Chief Complaint   Patient presents with    Follow - Up     F/u Right hallux on and of pain 4/10 and swelling for the past month. - Pt had partial nail avulsion with chemical matrixectomy on 04/05/24.         HPI:     Patient is a pleasant 29-year-old female is returning to clinic today with concerns of possible regrowth of ingrown toenail.  Patient did have a partial nail avulsion with chemical matrixectomy to the lateral border of her right hallux on 4/5/2024.  Patient states that she has been having intermittent pain and swelling recently.  She did go to an outside podiatrist, who told her that the ingrown possibly grew back or there is a cyst formation.  She currently rates her pain on and off a 4/10.  Denies any recent signs of infection.  No other concerns.  She is presenting today for further evaluation and care.  Denies recent nausea, vomiting, fevers, chills.      Allergies: Patient has no known allergies.   Current Outpatient Medications   Medication Sig Dispense Refill    Isotretinoin 30 MG Oral Cap Take 2 capsules (60 mg total) by mouth daily. 60 capsule 0    Isotretinoin 30 MG Oral Cap Take 2 capsules (60 mg total) by mouth daily. 60 capsule 0    ketoconazole 2 % External Shampoo Use 2-3 times weekly. Lather into scalp and leave on for 5 minutes before washing off. 120 mL 11    cholecalciferol (VITAMIN D3) 1.25 MG (97948 UT) Oral Cap Take 1 capsule (50,000 Units total) by mouth once a week. 12 capsule 3    Norgestimate-Eth Estradiol (SPRINTEC 28) 0.25-35 MG-MCG Oral Tab Take 1 tablet by mouth daily. 84 tablet 3      Past Medical History:    Acne      Past Surgical History:   Procedure Laterality Date    Other surgical history  March-July 2021    Mole removals      Family History   Problem Relation Age of Onset    Cancer Paternal Grandmother         Breast cancer    Cancer Maternal Grandfather         Lung cancer that  metastasized to the brain      Social History     Socioeconomic History    Marital status: Single   Tobacco Use    Smoking status: Never    Smokeless tobacco: Never   Vaping Use    Vaping status: Never Used   Substance and Sexual Activity    Alcohol use: Yes     Alcohol/week: 2.0 standard drinks of alcohol     Types: 1 Glasses of wine, 1 Standard drinks or equivalent per week     Comment: socially    Drug use: No   Other Topics Concern    Grew up on a farm No    History of tanning No    Outdoor occupation No    Breast feeding No    Reaction to local anesthetic No    Pt has a pacemaker No    Pt has a defibrillator No           REVIEW OF SYSTEMS:     10 point ROS completed and was negative, except for pertinent positive and negatives stated in subjective.       EXAM:     GENERAL: well developed, well nourished, in no apparent distress  EXTREMITIES:  1. Integument: Lateral of right hallux toenail does appear to be ingrowing again, mostly to the base.  There is no current erythema, purulent drainage, or other signs of infection.  Remaining toenails x 9 are of normal morphology and adequate length..  Skin appears moist, warm, and supple with positive hair growth. There are no color changes. No open lesions. No macerations. No Hyperkeratotic lesions.   2. Vascular: Dorsalis pedis 2/4 bilateral and posterior tibial pulses 2/4 bilateral, capillary refill normal.  3. Neurological: Gross sensation intact via light touch bilaterally.  Normal sharp/dull sensation  4. Musculoskeletal: Mild discomfort with palpation to lateral border of right hallux today.  All muscle groups are graded 5/5 in the foot and ankle.       ASSESSMENT AND PLAN:   Diagnoses and all orders for this visit:    Ingrowing nail, right great toe    Toe pain, right        Plan:   -Patient was seen and evaluated today in clinic.  Chart history reviewed.    Discussed clinical exam findings.  It does appear that there is mild regrowth of ingrown toenail to the  lateral border right hallux.  Discussed treatment options.  I do recommend redoing the partial nail avulsion to the lateral border of the right hallux with chemical matrixectomy.    Treatment options reviewed with the patient related to condition/diagnosis.  Discussed advantages and disadvantages as well as risks/potential complications related to nail surgery.  Risk of recurrence explained. All questions answered, informed consent reviewed and pt agrees to proceed.  PROCEDURE: 87391 partial nail avulsion with chemical matrixectomy to lateral border of right hallux  Local infiltration was given consisting of 5 cc of a 1 :1 mixture of 0.5% marcaine plain and 1% lidocaine plain  Betadine prep was preformed to the affected toe followed by proper sterile draping.  After confirming full anesthetic effect to the proper site, the nail fold was released from the lateral border(s) of the toe.  An english anvil was used to initiate the linear splitting of the nail border(s) and completed with a #62 blade.  A hemostat was used to completely resect the offending nail border(s). Curettage was performed to the nail bed and exposed nail matrix. At that time, 3 - 30 second applications of Phenol was applied to the exposed nail matrix using cotton tip applicators. The site was lightly irrigated with alcohol and a moist sterile compressive dressing was applied.  Pt tolerated procedure well with no complications encountered.  Educated patient on signs of infection and encouraged patient to contact my office and seek immediate medical attention if noticing any of these signs.  Written instructions have been given and reviewed with pt regarding after care.    -The patient indicates understanding of these issues and agrees to the plan.      RTC 2-3 weeks      Bert Epperson DPM        [facility]  [id number] [address] [address]     9/13/2024    Dragon speech recognition software was used to prepare this  note.  Errors in word recognition may occur.  Please contact me with any questions/concerns with this note.

## 2024-10-08 ENCOUNTER — OFFICE VISIT (OUTPATIENT)
Facility: LOCATION | Age: 29
End: 2024-10-08
Payer: MEDICAID

## 2024-10-08 DIAGNOSIS — Z98.890 STATUS POST NAIL SURGERY: Primary | ICD-10-CM

## 2024-10-08 PROCEDURE — 99212 OFFICE O/P EST SF 10 MIN: CPT | Performed by: PODIATRIST

## 2024-10-08 NOTE — PROGRESS NOTES
[facility]  Progress Note    Alessia Bhatti is a 29 year old female.   Chief Complaint   Patient presents with    Follow - Up     R hallux f/u- Pt states she is doing well. Denies any pain or concerns.         HPI:     Patient is a pleasant 29-year-old female who is returning to clinic today for recheck after secondary partial nail avulsion with chemical matrixectomy to lateral border of right hallux.  Patient states she is doing well overall, denying any signs of infection or pain.  She continues to soak and keep site covered with topical antibiotics and a Band-Aid.  Denying any other concerns at this time is presenting today for further evaluation care.  Denies recent nausea, vomiting.  No chills.      Allergies: Patient has no known allergies.   Current Outpatient Medications   Medication Sig Dispense Refill    Isotretinoin 30 MG Oral Cap Take 2 capsules (60 mg total) by mouth daily. 60 capsule 0    Isotretinoin 30 MG Oral Cap Take 2 capsules (60 mg total) by mouth daily. 60 capsule 0    ketoconazole 2 % External Shampoo Use 2-3 times weekly. Lather into scalp and leave on for 5 minutes before washing off. 120 mL 11    cholecalciferol (VITAMIN D3) 1.25 MG (41649 UT) Oral Cap Take 1 capsule (50,000 Units total) by mouth once a week. 12 capsule 3    Norgestimate-Eth Estradiol (SPRINTEC 28) 0.25-35 MG-MCG Oral Tab Take 1 tablet by mouth daily. 84 tablet 3      Past Medical History:    Acne      Past Surgical History:   Procedure Laterality Date    Other surgical history  March-July 2021    Mole removals      Family History   Problem Relation Age of Onset    Cancer Paternal Grandmother         Breast cancer    Cancer Maternal Grandfather         Lung cancer that metastasized to the brain      Social History     Socioeconomic History    Marital status: Single   Tobacco Use    Smoking status: Never    Smokeless tobacco: Never   Vaping Use    Vaping status: Never Used   Substance and Sexual Activity     Alcohol use: Yes     Alcohol/week: 2.0 standard drinks of alcohol     Types: 1 Glasses of wine, 1 Standard drinks or equivalent per week     Comment: socially    Drug use: No   Other Topics Concern    Grew up on a farm No    History of tanning No    Outdoor occupation No    Breast feeding No    Reaction to local anesthetic No    Pt has a pacemaker No    Pt has a defibrillator No           REVIEW OF SYSTEMS:     10 point ROS completed and was negative, except for pertinent positive and negatives stated in subjective.       EXAM:     GENERAL: well developed, well nourished, in no apparent distress  EXTREMITIES:  1. Integument: Status post partial nail avulsion with chemical matrixectomy to lateral of right hallux toenail, which does appear stable today.  There is no nonviable tissue.  There is no current erythema, purulent drainage, or other signs of infection.  Remaining toenails x 9 are of normal morphology and adequate length..  Skin appears moist, warm, and supple with positive hair growth. There are no color changes. No open lesions. No macerations. No Hyperkeratotic lesions.   2. Vascular: Dorsalis pedis 2/4 bilateral and posterior tibial pulses 2/4 bilateral, capillary refill normal.  3. Neurological: Gross sensation intact via light touch bilaterally.  Normal sharp/dull sensation  4. Musculoskeletal: No pain with palpation to lateral border of right hallux today.  All muscle groups are graded 5/5 in the foot and ankle.       ASSESSMENT AND PLAN:   Diagnoses and all orders for this visit:    Status post nail surgery        Plan:   -Patient was seen and evaluated today in clinic.  Chart history reviewed.    Patient was seen and evaluated today in clinic.  They are status post partial nail avulsion with chemical matrixectomy to lateral border of right great toe.  No current signs of infection noted.    Patient to discontinue daily foot soaks, antibiotic cream, and Band-Aids at this time.  Okay for patient to  utilize Band-Aid when in shoe gear for added protection.  Encouraged to allow site to air out.    Recommend patient monitor for any signs of infection and contact my office and seek immediate medical attention if noticing any of these signs.    All of patient's questions were addressed and they will contact the office with any concerns in the future.      -The patient indicates understanding of these issues and agrees to the plan.    Time spent reviewing pertinent information from patient's chart, reviewing any pertinent imaging, obtaining history and physical exam, discussing and mutually agreeing on a treatment plan, and documenting encounter: 8 minutes    RTC as needed      Bert Epperson DPM        [facility]  [address] [place] [id number]     10/8/2024    Dragon speech recognition software was used to prepare this note.  Errors in word recognition may occur.  Please contact me with any questions/concerns with this note.

## 2024-10-29 ENCOUNTER — LAB ENCOUNTER (OUTPATIENT)
Dept: LAB | Age: 29
End: 2024-10-29
Attending: STUDENT IN AN ORGANIZED HEALTH CARE EDUCATION/TRAINING PROGRAM
Payer: MEDICAID

## 2024-10-29 ENCOUNTER — OFFICE VISIT (OUTPATIENT)
Dept: DERMATOLOGY CLINIC | Facility: CLINIC | Age: 29
End: 2024-10-29
Payer: MEDICAID

## 2024-10-29 DIAGNOSIS — Z51.81 MEDICATION MONITORING ENCOUNTER: ICD-10-CM

## 2024-10-29 DIAGNOSIS — L70.0 ACNE VULGARIS: Primary | ICD-10-CM

## 2024-10-29 DIAGNOSIS — L70.8 OTHER ACNE: ICD-10-CM

## 2024-10-29 DIAGNOSIS — L70.0 ACNE VULGARIS: ICD-10-CM

## 2024-10-29 LAB
ALT SERPL-CCNC: 25 U/L
AST SERPL-CCNC: 24 U/L (ref ?–34)
B-HCG UR QL: NEGATIVE
TRIGL SERPL-MCNC: 75 MG/DL (ref 30–149)

## 2024-10-29 PROCEDURE — 84450 TRANSFERASE (AST) (SGOT): CPT

## 2024-10-29 PROCEDURE — 84460 ALANINE AMINO (ALT) (SGPT): CPT

## 2024-10-29 PROCEDURE — 84478 ASSAY OF TRIGLYCERIDES: CPT

## 2024-10-29 PROCEDURE — 99214 OFFICE O/P EST MOD 30 MIN: CPT | Performed by: STUDENT IN AN ORGANIZED HEALTH CARE EDUCATION/TRAINING PROGRAM

## 2024-10-29 PROCEDURE — 81025 URINE PREGNANCY TEST: CPT

## 2024-10-29 PROCEDURE — 36415 COLL VENOUS BLD VENIPUNCTURE: CPT

## 2024-10-29 RX ORDER — VALACYCLOVIR HYDROCHLORIDE 1 G/1
2000 TABLET, FILM COATED ORAL 2 TIMES DAILY
COMMUNITY
Start: 2024-09-13

## 2024-10-29 RX ORDER — ISOTRETINOIN 30 MG/1
60 CAPSULE ORAL DAILY
Qty: 60 CAPSULE | Refills: 0 | Status: SHIPPED | OUTPATIENT
Start: 2024-10-29

## 2024-10-29 NOTE — PROGRESS NOTES
October 29, 2024    Established patient     Referred by:   No referring provider defined for this encounter.     CHIEF COMPLAINT: Accutane f/u    HISTORY OF PRESENT ILLNESS: .    1. Acne  Location: Face   Duration: 1-2 years  Signs and symptoms: Notes Improvement, normal dryness  Current treatment: Isotretinoin 60 MG Oral Cap   Past treatments: Same as above    Last Labs: 9/10/24    DERM HISTORY:  History of skin cancer: No  History of chronic skin disease/condition: No    FAMILY HISTORY:  History of melanoma: No  History of chronic skin disease/condition: No    History/Other:    REVIEW OF SYSTEMS:  Constitutional: Denies fever, chills, unintentional weight loss.   Skin as per HPI    PAST MEDICAL HISTORY:  Past Medical History:    Acne       Medications  Current Outpatient Medications   Medication Sig Dispense Refill    Isotretinoin 30 MG Oral Cap Take 2 capsules (60 mg total) by mouth daily. 60 capsule 0    Isotretinoin 30 MG Oral Cap Take 2 capsules (60 mg total) by mouth daily. 60 capsule 0    ketoconazole 2 % External Shampoo Use 2-3 times weekly. Lather into scalp and leave on for 5 minutes before washing off. 120 mL 11    cholecalciferol (VITAMIN D3) 1.25 MG (80832 UT) Oral Cap Take 1 capsule (50,000 Units total) by mouth once a week. 12 capsule 3    Norgestimate-Eth Estradiol (SPRINTEC 28) 0.25-35 MG-MCG Oral Tab Take 1 tablet by mouth daily. 84 tablet 3       Objective:    PHYSICAL EXAM:  General: awake, alert, no acute distress  Skin: Skin exam was performed today including the following: face. Pertinent findings include:   - clear today    ASSESSMENT & PLAN:  Pathophysiology of diagnoses discussed with patient.  Therapeutic options reviewed. Risks, benefits, and alternatives discussed with patient. Instructions reviewed at length.    ASSESSMENT & PLAN:  Pathophysiology of diagnoses discussed with patient.  Therapeutic options reviewed. Risks, benefits, and alternatives discussed with patient. Instructions  reviewed at length.     #Acne Vulgaris, nodulocystic - 10lb weight gain since starting OCP  #Acne, nodulocystic  - Target dose ~150-200 mg/kg = 11429np  - Current cumulative dose is 60790 mg.  - Continue isotretinoin at 60 mg daily     #Medication monitoring encounter  #On Accutane therapy   - Repeat ALT/AST, fasting lipid panel and qualitative pregnancy test today  - Confirmed to forms of contraception, primary is OCP with backup of condoms. Reviewed that patient must be on remain on chosen forms of contraception while on the medication and continue 1 month after stopping.   - Patient agrees not to donate blood or share medication while on medication and for 1 month after      #Cheilitis  - Recommend liberal use of Vaseline and/or Aquaphor to lips multiple times per day     #Xerosis  - Reviewed principles of dry skin care  - Recommend frequent application of emollients/moisturizers with built-in sunscreen of SPF 30+        Rashel Gutierrez MD     Return to clinic 1 month

## 2024-10-30 NOTE — PROGRESS NOTES
Pt confirmed in [employer].  Patient can obtain their prescription from:  October 29, 2024 - November 04, 2024 (7 - Day Prescription window)

## 2024-11-04 ENCOUNTER — HOSPITAL ENCOUNTER (OUTPATIENT)
Age: 29
Discharge: HOME | End: 2024-11-04
Payer: MEDICAID

## 2024-11-04 VITALS
RESPIRATION RATE: 18 BRPM | HEART RATE: 88 BPM | DIASTOLIC BLOOD PRESSURE: 73 MMHG | OXYGEN SATURATION: 100 % | SYSTOLIC BLOOD PRESSURE: 137 MMHG | TEMPERATURE: 98 F

## 2024-11-04 DIAGNOSIS — J02.9 ACUTE PHARYNGITIS, UNSPECIFIED ETIOLOGY: Primary | ICD-10-CM

## 2024-11-04 DIAGNOSIS — R07.0 THROAT PAIN IN ADULT: ICD-10-CM

## 2024-11-04 LAB
S PYO AG THROAT QL: NEGATIVE
SARS-COV-2 RNA RESP QL NAA+PROBE: NOT DETECTED

## 2024-11-04 PROCEDURE — 99213 OFFICE O/P EST LOW 20 MIN: CPT | Performed by: PHYSICIAN ASSISTANT

## 2024-11-04 PROCEDURE — 87880 STREP A ASSAY W/OPTIC: CPT | Performed by: PHYSICIAN ASSISTANT

## 2024-11-04 PROCEDURE — U0002 COVID-19 LAB TEST NON-CDC: HCPCS | Performed by: PHYSICIAN ASSISTANT

## 2024-11-04 RX ORDER — IBUPROFEN 600 MG/1
TABLET, FILM COATED ORAL
Qty: 20 TABLET | Refills: 0 | Status: SHIPPED | OUTPATIENT
Start: 2024-11-04

## 2024-11-04 NOTE — ED PROVIDER NOTES
Patient Seen in: [facility]    History     Chief Complaint   Patient presents with    Sore Throat     Stated Complaint: sore throat, body aches, chills    HPI    Alessia Bhatti is a 29 year old female presents with chief complaint of sore throat.  Onset this morning.  Patient reports associated nasal congestion and postnasal drip.  Patient states they are tolerating solid food and oral liquids.  Patient denies fever, chills, earache, trismus, drooling, neck pain, restricted neck movement, neck swelling, rash, cough, dyspnea, wheeze, abdominal pain, nausea, vomiting, diarrhea, constipation.      Past Medical History:    Acne       Past Surgical History:   Procedure Laterality Date    Other surgical history  March-July 2021    Mole removals            Family History   Problem Relation Age of Onset    Cancer Paternal Grandmother         Breast cancer    Cancer Maternal Grandfather         Lung cancer that metastasized to the brain       Social History     Socioeconomic History    Marital status: Single   Tobacco Use    Smoking status: Never    Smokeless tobacco: Never   Vaping Use    Vaping status: Never Used   Substance and Sexual Activity    Alcohol use: Yes     Alcohol/week: 2.0 standard drinks of alcohol     Types: 1 Glasses of wine, 1 Standard drinks or equivalent per week     Comment: socially    Drug use: No   Other Topics Concern    Grew up on a farm No    History of tanning No    Outdoor occupation No    Breast feeding No    Reaction to local anesthetic No    Pt has a pacemaker No    Pt has a defibrillator No     Social Drivers of Health     Financial Resource Strain: Low Risk  (7/7/2022)    [facility]    Overall Financial Resource Strain (CARDIA)     Difficulty of Paying Living Expenses: Not hard at all   Food Insecurity: No Food Insecurity (7/7/2022)    Received from [facility], [facility]  [facility]    Hunger Vital Sign     Worried About Running Out of Food in the Last Year: Never true     Ran Out of Food in the Last Year: Never true   Transportation Needs: No Transportation Needs (7/7/2022)    [facility]    PRAPARE - Transportation     Lack of Transportation (Medical): No     Lack of Transportation (Non-Medical): No       Review of Systems    Positive for stated complaint: sore throat, body aches, chills  Other systems are as noted in HPI.  Constitutional and vital signs reviewed.      All other systems reviewed and negative except as noted above.    PSFH elements reviewed from today and agreed except as otherwise stated in HPI.    Physical Exam     ED Triage Vitals [11/04/24 1516]   /73   Pulse 88   Resp 18   Temp 98.3 °F (36.8 °C)   Temp src Temporal   SpO2 100 %   O2 Device None (Room air)       Current:/73   Pulse 88   Temp 98.3 °F (36.8 °C) (Temporal)   Resp 18   LMP 10/11/2024 (Exact Date)   SpO2 100%     PULSE OX within normal limits on room air as interpreted by this provider.     Constitutional: The patient is cooperative. Appears well-developed and well-nourished.  Mild discomfort.  Psychological: Alert, No abnormalities of mood, affect.  Head: Normocephalic/atraumatic.    Eyes: Pupils are equal round reactive to light.  Conjunctiva are within normal limits.  ENT: Pharynx injected.  Tonsils within normal size limits bilaterally.  No tonsillar exudates.  Uvula midline.  No trismus.  No drooling.  TMs within normal limits bilaterally.  Mucous membranes moist.  Neck: The neck is supple.  Nontender.  No meningeal signs.  Respiratory: Respiratory effort was normal.  There is no stridor.  Air entry is equal.  Cardiovascular: Regular rate and rhythm.  Capillary refill is brisk.    Genitourinary: Not examined.  Lymphatic: No gross lymphadenopathy noted.  Musculoskeletal: Musculoskeletal system is grossly  intact.  There is no obvious deformity.  Neurological: Gross motor movement is intact in all 4 extremities.  Patient exhibits normal speech.  Skin: Skin is normal to inspection.  Warm and dry.  No obvious bruising.  No obvious rash.        ED Course     Labs Reviewed   POCT RAPID STREP - Normal   RAPID SARS-COV-2 BY PCR - Normal       MDM     Differential diagnosis prior to work-up including but not limited to strep pharyngitis, viral pharyngitis, URI    Rapid strep negative.  COVID-19 negative.    Physical exam remained stable over serial reexaminations as previously documented.  Results reviewed with patient.    I have given the patient instructions regarding their diagnoses, expectations, follow up, and ER precautions. I explained to the patient that emergent conditions may arise and to go to the ER for new, worsening or any persistent conditions. I've explained the importance of following up with their doctor as instructed. The patient verbalized understanding of the discharge instructions and plan.        Disposition and Plan     Clinical Impression:  1. Acute pharyngitis, unspecified etiology    2. Throat pain in adult        Disposition:  Discharge    Follow-up:  Meagan Arteaga, [facility]  [address] [address]  [phone]    Call in 1 day  For follow-up      Medications Prescribed:  Current Discharge Medication List        START taking these medications    Details   ibuprofen 600 MG Oral Tab Take 1 tablet (600 mg total) by mouth every 6 hours with food  Qty: 20 tablet, Refills: 0      phenol 1.4 % Mouth/Throat Liquid Use as directed 1 mL in the mouth or throat every 2 (two) hours as needed. For 5 days  Qty: 177 mL, Refills: 0

## 2024-12-02 ENCOUNTER — TELEPHONE (OUTPATIENT)
Dept: OBGYN CLINIC | Facility: CLINIC | Age: 29
End: 2024-12-02

## 2024-12-02 NOTE — TELEPHONE ENCOUNTER
Patient verified name and     Informed she is due for 3rd dose. Scheduled 24. Aware of scheduling details.

## 2024-12-03 ENCOUNTER — OFFICE VISIT (OUTPATIENT)
Dept: DERMATOLOGY CLINIC | Facility: CLINIC | Age: 29
End: 2024-12-03
Payer: MEDICAID

## 2024-12-03 DIAGNOSIS — Z51.81 MEDICATION MONITORING ENCOUNTER: ICD-10-CM

## 2024-12-03 DIAGNOSIS — L70.0 ACNE VULGARIS: Primary | ICD-10-CM

## 2024-12-03 PROCEDURE — 99214 OFFICE O/P EST MOD 30 MIN: CPT | Performed by: STUDENT IN AN ORGANIZED HEALTH CARE EDUCATION/TRAINING PROGRAM

## 2024-12-03 RX ORDER — TAZAROTENE 1 MG/G
1 CREAM TOPICAL NIGHTLY
Qty: 30 G | Refills: 11 | Status: SHIPPED | OUTPATIENT
Start: 2024-12-03

## 2024-12-03 NOTE — PROGRESS NOTES
December 3, 2024    Established patient     Referred by:   No referring provider defined for this encounter.     CHIEF COMPLAINT: Accutane f/u     HISTORY OF PRESENT ILLNESS: .    1. Acne  Location: Face   Duration: 1-2 years  Signs and symptoms: Notes Improvement, normal dryness  Current treatment: Isotretinoin 60 MG Oral Cap   Past treatments: Same as above    Last Labs: 10/29/24    DERM HISTORY:  History of skin cancer: No  History of chronic skin disease/condition: No    FAMILY HISTORY:  History of melanoma: No  History of chronic skin disease/condition: No    History/Other:    REVIEW OF SYSTEMS:  Constitutional: Denies fever, chills, unintentional weight loss.   Skin as per HPI    PAST MEDICAL HISTORY:  Past Medical History:    Acne       Medications  Current Outpatient Medications   Medication Sig Dispense Refill    Isotretinoin 30 MG Oral Cap Take 2 capsules (60 mg total) by mouth daily. 60 capsule 0    ibuprofen 600 MG Oral Tab Take 1 tablet (600 mg total) by mouth every 6 hours with food 20 tablet 0    phenol 1.4 % Mouth/Throat Liquid Use as directed 1 mL in the mouth or throat every 2 (two) hours as needed. For 5 days 177 mL 0    valACYclovir 1 G Oral Tab Take 2 tablets (2,000 mg total) by mouth 2 (two) times daily.      Isotretinoin 30 MG Oral Cap Take 2 capsules (60 mg total) by mouth daily. 60 capsule 0    ketoconazole 2 % External Shampoo Use 2-3 times weekly. Lather into scalp and leave on for 5 minutes before washing off. 120 mL 11    cholecalciferol (VITAMIN D3) 1.25 MG (59767 UT) Oral Cap Take 1 capsule (50,000 Units total) by mouth once a week. 12 capsule 3    Norgestimate-Eth Estradiol (SPRINTEC 28) 0.25-35 MG-MCG Oral Tab Take 1 tablet by mouth daily. 84 tablet 3       Objective:    PHYSICAL EXAM:  General: awake, alert, no acute distress  Skin: Skin exam was performed today including the following: face. Pertinent findings include:   - clear today    ASSESSMENT & PLAN:  Pathophysiology of  diagnoses discussed with patient.  Therapeutic options reviewed. Risks, benefits, and alternatives discussed with patient. Instructions reviewed at length.    ASSESSMENT & PLAN:  Pathophysiology of diagnoses discussed with patient.  Therapeutic options reviewed. Risks, benefits, and alternatives discussed with patient. Instructions reviewed at length.     #Acne Vulgaris, nodulocystic - 10lb weight gain since starting OCP  #Acne, nodulocystic  - Target dose ~150-200 mg/kg = 81876xa  - Current cumulative dose is 42027 mg.  - Ok to hold isotretinoin at this time as goal dose met. Start tazarotene 0.1% nightly.      #Medication monitoring encounter  #On Accutane therapy   - Confirmed to forms of contraception, primary is OCP with backup of condoms. Reviewed that patient must be on remain on chosen forms of contraception while on the medication and continue 1 month after stopping.   - Patient agrees not to donate blood or share medication while on medication and for 1 month after      #Cheilitis  - Recommend liberal use of Vaseline and/or Aquaphor to lips multiple times per day     #Xerosis  - Reviewed principles of dry skin care  - Recommend frequent application of emollients/moisturizers with built-in sunscreen of SPF 30+        Rashel Gutierrez MD     Return to clinic 1 month

## 2024-12-06 ENCOUNTER — NURSE ONLY (OUTPATIENT)
Dept: OBGYN CLINIC | Facility: CLINIC | Age: 29
End: 2024-12-06
Payer: MEDICAID

## 2024-12-06 VITALS — DIASTOLIC BLOOD PRESSURE: 76 MMHG | SYSTOLIC BLOOD PRESSURE: 119 MMHG

## 2024-12-06 DIAGNOSIS — Z23 NEED FOR VACCINATION: Primary | ICD-10-CM

## 2024-12-06 LAB
CONTROL LINE PRESENT WITH A CLEAR BACKGROUND (YES/NO): NEGATIVE YES/NO
PREGNANCY TEST, URINE: NEGATIVE

## 2024-12-06 PROCEDURE — 90471 IMMUNIZATION ADMIN: CPT | Performed by: STUDENT IN AN ORGANIZED HEALTH CARE EDUCATION/TRAINING PROGRAM

## 2024-12-06 PROCEDURE — 90651 9VHPV VACCINE 2/3 DOSE IM: CPT | Performed by: STUDENT IN AN ORGANIZED HEALTH CARE EDUCATION/TRAINING PROGRAM

## 2024-12-06 PROCEDURE — 81025 URINE PREGNANCY TEST: CPT | Performed by: STUDENT IN AN ORGANIZED HEALTH CARE EDUCATION/TRAINING PROGRAM

## 2025-02-21 ENCOUNTER — TELEPHONE (OUTPATIENT)
Dept: DERMATOLOGY CLINIC | Facility: CLINIC | Age: 30
End: 2025-02-21

## 2025-02-21 DIAGNOSIS — Z51.81 MEDICATION MONITORING ENCOUNTER: Primary | ICD-10-CM

## 2025-03-18 ENCOUNTER — OFFICE VISIT (OUTPATIENT)
Dept: DERMATOLOGY CLINIC | Facility: CLINIC | Age: 30
End: 2025-03-18
Payer: MEDICAID

## 2025-03-18 ENCOUNTER — LAB ENCOUNTER (OUTPATIENT)
Dept: LAB | Age: 30
End: 2025-03-18
Attending: STUDENT IN AN ORGANIZED HEALTH CARE EDUCATION/TRAINING PROGRAM
Payer: MEDICAID

## 2025-03-18 DIAGNOSIS — L70.0 ACNE VULGARIS: Primary | ICD-10-CM

## 2025-03-18 DIAGNOSIS — Z51.81 MEDICATION MONITORING ENCOUNTER: ICD-10-CM

## 2025-03-18 LAB — B-HCG UR QL: NEGATIVE

## 2025-03-18 PROCEDURE — 99213 OFFICE O/P EST LOW 20 MIN: CPT | Performed by: STUDENT IN AN ORGANIZED HEALTH CARE EDUCATION/TRAINING PROGRAM

## 2025-03-18 PROCEDURE — 81025 URINE PREGNANCY TEST: CPT

## 2025-03-18 NOTE — PROGRESS NOTES
March 18, 2025    Established patient     Referred by:   No referring provider defined for this encounter.     CHIEF COMPLAINT: Acne F/U     HISTORY OF PRESENT ILLNESS: .    1. Acne  Location: Face   Duration: 1-2 years  Signs and symptoms: Notes Improvement, very minor breakouts   Current treatment: tazarotene 0.1% nightly.   Past treatments: Isotretinoin 60 MG Oral Cap     DERM HISTORY:  History of skin cancer: No  History of chronic skin disease/condition: No    FAMILY HISTORY:  History of melanoma: No  History of chronic skin disease/condition: No    History/Other:    REVIEW OF SYSTEMS:  Constitutional: Denies fever, chills, unintentional weight loss.   Skin as per HPI    PAST MEDICAL HISTORY:  Past Medical History:    Acne       Medications  Current Outpatient Medications   Medication Sig Dispense Refill    Tazarotene 0.1 % External Cream Apply 1 Application topically nightly. 30 g 11    ibuprofen 600 MG Oral Tab Take 1 tablet (600 mg total) by mouth every 6 hours with food 20 tablet 0    phenol 1.4 % Mouth/Throat Liquid Use as directed 1 mL in the mouth or throat every 2 (two) hours as needed. For 5 days 177 mL 0    valACYclovir 1 G Oral Tab Take 2 tablets (2,000 mg total) by mouth 2 (two) times daily.      Isotretinoin 30 MG Oral Cap Take 2 capsules (60 mg total) by mouth daily. 60 capsule 0    Isotretinoin 30 MG Oral Cap Take 2 capsules (60 mg total) by mouth daily. 60 capsule 0    ketoconazole 2 % External Shampoo Use 2-3 times weekly. Lather into scalp and leave on for 5 minutes before washing off. 120 mL 11    cholecalciferol (VITAMIN D3) 1.25 MG (04947 UT) Oral Cap Take 1 capsule (50,000 Units total) by mouth once a week. 12 capsule 3    Norgestimate-Eth Estradiol (SPRINTEC 28) 0.25-35 MG-MCG Oral Tab Take 1 tablet by mouth daily. 84 tablet 3       Objective:    PHYSICAL EXAM:  General: awake, alert, no acute distress  Skin: Skin exam was performed today including the following: face. Pertinent findings  include:   - with skin colored atrophic papules on mid cheeks     ASSESSMENT & PLAN:  Pathophysiology of diagnoses discussed with patient.  Therapeutic options reviewed. Risks, benefits, and alternatives discussed with patient. Instructions reviewed at length.     #Acne Vulgaris, doing well - status post accutane dosed at 200mg/kg  - Recommend lasers for acne scaring. Referred to [facility] [place]   - Continue Tazarotene once every other night        Return to clinic as needed     Darrell Valladares MD    By signing my name below, ILoida MA,  attest that this documentation has been prepared under the direction and in the presence of Darrell Valladares MD.   Electronically Signed: Loida COLBERT MA, 3/18/2025, 9:12 AM.    I, Darrell Valladares MD,  personally performed the services described in this documentation. All medical record entries made by the scribe were at my direction and in my presence.  I have reviewed the chart and agree that the record reflects my personal performance and is accurate and complete.  Darrell Valladares MD, 3/18/2025, 10:57 AM

## 2025-04-14 ENCOUNTER — TELEPHONE (OUTPATIENT)
Dept: FAMILY MEDICINE CLINIC | Facility: CLINIC | Age: 30
End: 2025-04-14

## 2025-04-14 DIAGNOSIS — E55.9 VITAMIN D DEFICIENCY: ICD-10-CM

## 2025-04-17 ENCOUNTER — PATIENT MESSAGE (OUTPATIENT)
Age: 30
End: 2025-04-17

## 2025-04-17 RX ORDER — FOLIC ACID 1 MG/1
50000 TABLET ORAL WEEKLY
Qty: 12 CAPSULE | Refills: 0 | OUTPATIENT
Start: 2025-04-17

## 2025-04-17 NOTE — TELEPHONE ENCOUNTER
Please review. Protocol Failed; No Protocol    Bharati Micah patient    No future appointments.    [facility] message sent to patient to schedule an office visit with Provider and/or  Routed to Patient  for assistance with appointment.

## 2025-04-17 NOTE — TELEPHONE ENCOUNTER
Spoke to patient, full name and date of birth verified.  Patient called in regards to message received about refill.   Patient [place] out of [place] about 3 weeks ago and was unable to schedule with ANUJ Obrien before she left.     Patient stated she did not request the refill of ergocalciferol.     Patient will be establishing care at her new location.     RN explained that the high-dose vitamin D is not normally refilled until her vitamin D levels are re-checked by the lab.     Patient verbalized understanding and does not feel she needs this refill.     RN called [place] [id number], spoke to Jeaneth and informed them patient has moved Out of State.

## (undated) NOTE — LETTER
12/4/2021              Alessia Bhatti        [address] [place] [id number]         Dear Sheba Mejias,    Our records indicate that the lab tests ordered for you by Nikko Terrazas MD  have not been done.   If you have, in fact, trinh

## (undated) NOTE — LETTER
VACCINE ADMINISTRATION RECORD  PARENT / GUARDIAN APPROVAL  Date: 3/22/2024  Vaccine administered to: Alessia Bhatti     : 1995    MRN: [id number]    A copy of the appropriate Centers for Disease Control and Prevention Vaccine Information statement has been provided. I have read or have had explained the information about the diseases and the vaccines listed below. There was an opportunity to ask questions and any questions were answered satisfactorily. I believe that I understand the benefits and risks of the vaccine cited and ask that the vaccine(s) listed below be given to me or to the person named above (for whom I am authorized to make this request).    VACCINES ADMINISTERED:  Gardasil    I have read and hereby agree to be bound by the terms of this agreement as stated above. My signature is valid until revoked by me in writing.  This document is signed by self, relationship: Self on 3/22/2024.:                                                                                                                                         Parent / Guardian Signature                                                Date    Milly BRYSON MA served as a witness to authentication that the identity of the person signing electronically is in fact the person represented as signing.    This document was generated by Milly BRYSON MA on 3/22/2024.

## (undated) NOTE — LETTER
3/30/2022              Alessia Bhatti        [address] [address]         Dear Yudy Dent records indicate that the pelvic ultrasound test ordered for you by [facility]  has not been done. If you have, in fact, already completed the test or you do not wish to have the test done, please contact our office at [address] [place] [address]. Otherwise, please proceed with the testing.         Sincerely,    [facility]  [address]   [address] [place] [address]  [phone]

## (undated) NOTE — MR AVS SNAPSHOT
After Visit Summary   11/4/2021    Desmond Rivas   MRN: [id number]           Visit Information     Date & Time  11/4/2021  2:10 PM Provider  Cassandra Amin, [facility], [address] [place], [place] [address].  Phone  [phone] 11/4/2021 (Approximate) 11/4/2022    HPV HIGH RISK , THIN PREP COLLECTION [RCZ7312 CUSTOM]  11/4/2021 11/4/2022    T PALLIDUM SCREENING CASCADE [1225355 CPT(R)]  11/4/2021 (Approximate) 11/4/2022    THINPREP PAP SMEAR B [OLP0203 CUSTOM]  11/4/2021 11/4/202

## (undated) NOTE — LETTER
AUTHORIZATION FOR SURGICAL OPERATION OR OTHER PROCEDURE    1. I hereby authorize Kali Baca, and [facility] staff assigned to my case to perform the following operation and/or procedure at the [facility] site:    _______________________________________________________________________________________________    Right hallux nail lateral border partial nail avulsion with chemical matrixectomy  _______________________________________________________________________________________________    2.  My physician has explained the nature and purpose of the operation or other procedure, possible alternative methods of treatment, the risks involved, and the possibility of complication to me.  I acknowledge that no guarantee has been made as to the result that may be obtained.  3.  I recognize that, during the course of this operation, or other procedure, unforseen conditions may necessitate additional or different procedure than those listed above.  I, therefore, further authorize and request that the above named physician, his/her physician assistants or designees perform such procedures as are, in his/her professional opinion, necessary and desirable.  4.  Any tissue or organs removed in the operation or other procedure may be disposed of by and at the discretion of the [facility] and [facility].  5.  I understand that in the event of a medical emergency, I will be transported by local paramedics to [facility] or other hospital emergency department.  6.  I certify that I have read and fully understand the above consent to operation and/or other procedure.    7.  I acknowledge that my physician has explained sedation/analgesia administration to me including the risks and benefits.  I consent to the administration of sedation/analgesia as may be necessary or desirable in the judgement of my physician.    Witness signature:  ___________________________________________________ Date:  ______/______/_____                    Time:  ________ A.M.  P.M.       Patient Name:  ______________________________________________________  (please print)      Patient signature:  ___________________________________________________             Relationship to Patient:           []  Parent    Responsible person                          []  Spouse  In case of minor or                    [] Other  _____________   Incompetent name:  __________________________________________________                               (please print)      _____________      Responsible person  In case of minor or  Incompetent signature:  _______________________________________________    Statement of Physician  My signature below affirms that prior to the time of the procedure, I have explained to the patient and/or his/her guardian, the risks and benefits involved in the proposed treatment and any reasonable alternative to the proposed treatment.  I have also explained the risks and benefits involved in the refusal of the proposed treatment and have answered the patient's questions.                        Date:  ______/______/_______  Provider                      Signature:  __________________________________________________________       Time:  ___________ ROSITA ODOM

## (undated) NOTE — MR AVS SNAPSHOT
After Visit Summary   3/22/2024    Alessia Bhatti   MRN: [id number]           Visit Information     Date & Time  3/22/2024  9:00 AM Provider  Nacho Shukla PA-C Department  [facility], [facility] - OB/GYN Dept. Phone  [phone]      Your Vitals Were  Most recent update: 3/22/2024  9:04 AM    BP   118/72 (BP Location: Left arm, Patient Position: Sitting, Cuff Size: adult)    Ht   63\"    Wt   143 lb    LMP   03/07/2024 (Exact Date)    BMI   25.33 kg/m²         Allergies as of 3/22/2024  Review status set to Review Complete on 3/22/2024   No Known Allergies     Your Current Medications        Dosage    Isotretinoin 40 MG Oral Cap Take 1 capsule (40 mg total) by mouth daily.    Norgestimate-Eth Estradiol (SPRINTEC 28) 0.25-35 MG-MCG Oral Tab Take 1 tablet by mouth daily.    Multiple Vitamin Oral Tab Take 1 tablet by mouth daily.      Diagnoses for This Visit    Well woman exam with routine gynecological exam   [366202]  -  Primary  Screening examination for STD (sexually transmitted disease)   [523089]    Pregnancy examination or test, pregnancy unconfirmed   [V72.40.ICD-9-CM]             We Ordered the Following     Normal Orders This Visit    Chlamydia/Gc Amplification [0971832 CUSTOM]     GARDASIL 9 [89599 CPT(R)]     POC Urine pregnancy test [06616] [16138 CPT(R)]     ThinPrep PAP with HPV Reflex Request [[id number] CUSTOM]     ThinPrep Pap with HPV Reflex, Chlamydia/GC [[id number] CUSTOM]     Future Labs/Procedures Expected by Expires    HCV Antibody [[id number] CUSTOM]  3/22/2024 (Approximate) 3/22/2025    Hepatitis B Surface Antigen [[id number] CUSTOM]  3/22/2024 (Approximate) 3/22/2025    HIV AG AB Combo [[id number] CUSTOM]  3/22/2024 (Approximate) 3/22/2025    T PALLIDUM SCREENING CASCADE [3105628 CPT(R)]  3/22/2024 (Approximate) 3/22/2025      Future Appointments        Provider Department    3/29/2024 11:00 AM Meagan Arteaga [facility],  [address], [place]                Did you know [place] [facility] [place] now offer Video Visits through [employer] for adult patients for a variety of conditions such as allergies, back pain and cold symptoms? Skip the drive and waiting room and online chat with a doctor face-to-face using your web-cam enabled computer or mobile device wherever you are. Video Visits cost $50 and can be paid hassle-free using a credit, debit, or health savings card.  Not active on [employer]? Ask us how to get signed up today!          If you receive a survey from [employer], please take a few minutes to complete it and provide feedback. We strive to deliver the best patient experience and are looking for ways to make improvements. Your feedback will help us do so. For more information on [employer], please visit [email or web address]           No text in SmartText           No text in SmartText

## (undated) NOTE — LETTER
AUTHORIZATION FOR SURGICAL OPERATION OR OTHER PROCEDURE    1. I hereby authorize Kali Baca DPM, and [facility] staff assigned to my case to perform the following operation and/or procedure at the [facility] site:    _______________________________________________________________________________________________    Right hallux lateral border partial nail avulsion with chemical matrixectomy   _______________________________________________________________________________________________    2.  My physician has explained the nature and purpose of the operation or other procedure, possible alternative methods of treatment, the risks involved, and the possibility of complication to me.  I acknowledge that no guarantee has been made as to the result that may be obtained.  3.  I recognize that, during the course of this operation, or other procedure, unforseen conditions may necessitate additional or different procedure than those listed above.  I, therefore, further authorize and request that the above named physician, his/her physician assistants or designees perform such procedures as are, in his/her professional opinion, necessary and desirable.  4.  Any tissue or organs removed in the operation or other procedure may be disposed of by and at the discretion of the [facility] and [facility].  5.  I understand that in the event of a medical emergency, I will be transported by local paramedics to [facility] or other hospital emergency department.  6.  I certify that I have read and fully understand the above consent to operation and/or other procedure.    7.  I acknowledge that my physician has explained sedation/analgesia administration to me including the risks and benefits.  I consent to the administration of sedation/analgesia as may be necessary or desirable in the judgement of my physician.    Witness signature:  ___________________________________________________ Date:  ______/______/_____                    Time:  ________ A.M.  P.M.       Patient Name:  ______________________________________________________  (please print)      Patient signature:  ___________________________________________________             Relationship to Patient:           []  Parent    Responsible person                          []  Spouse  In case of minor or                    [] Other  _____________   Incompetent name:  __________________________________________________                               (please print)      _____________      Responsible person  In case of minor or  Incompetent signature:  _______________________________________________    Statement of Physician  My signature below affirms that prior to the time of the procedure, I have explained to the patient and/or his/her guardian, the risks and benefits involved in the proposed treatment and any reasonable alternative to the proposed treatment.  I have also explained the risks and benefits involved in the refusal of the proposed treatment and have answered the patient's questions.                        Date:  ______/______/_______  Provider                      Signature:  __________________________________________________________       Time:  ___________ ROSITA ODOM